# Patient Record
Sex: FEMALE | Race: BLACK OR AFRICAN AMERICAN | Employment: FULL TIME | URBAN - METROPOLITAN AREA
[De-identification: names, ages, dates, MRNs, and addresses within clinical notes are randomized per-mention and may not be internally consistent; named-entity substitution may affect disease eponyms.]

---

## 2017-03-22 PROBLEM — Z03.75 SUSPECTED SHORTENING OF CERVIX NOT FOUND: Status: ACTIVE | Noted: 2017-03-22

## 2017-03-22 PROBLEM — O09.529 AMA (ADVANCED MATERNAL AGE) MULTIGRAVIDA 35+: Status: ACTIVE | Noted: 2017-03-22

## 2017-03-22 PROBLEM — O09.299 H/O PRE-ECLAMPSIA IN PRIOR PREGNANCY, CURRENTLY PREGNANT: Status: ACTIVE | Noted: 2017-03-22

## 2017-06-30 PROBLEM — O36.8390 FETAL HEART RATE DECELERATIONS AFFECTING MANAGEMENT OF MOTHER: Status: ACTIVE | Noted: 2017-06-30

## 2017-06-30 PROBLEM — O34.33 PREMATURE CERVICAL DILATION IN THIRD TRIMESTER: Status: ACTIVE | Noted: 2017-03-22

## 2018-02-14 PROBLEM — N30.01 ACUTE CYSTITIS WITH HEMATURIA: Status: ACTIVE | Noted: 2018-02-14

## 2018-02-14 PROBLEM — J11.1 INFLUENZA WITH RESPIRATORY MANIFESTATION OTHER THAN PNEUMONIA: Status: ACTIVE | Noted: 2018-02-14

## 2018-02-14 PROBLEM — B00.9 HERPES SIMPLEX: Status: ACTIVE | Noted: 2018-02-14

## 2019-01-28 ENCOUNTER — HOSPITAL ENCOUNTER (EMERGENCY)
Age: 39
Discharge: HOME | End: 2019-01-28
Payer: COMMERCIAL

## 2019-01-28 VITALS
DIASTOLIC BLOOD PRESSURE: 90 MMHG | BODY MASS INDEX: 28.14 KG/M2 | RESPIRATION RATE: 16 BRPM | SYSTOLIC BLOOD PRESSURE: 140 MMHG | OXYGEN SATURATION: 97 % | HEIGHT: 71 IN | HEART RATE: 90 BPM | WEIGHT: 201 LBS | TEMPERATURE: 100.4 F

## 2019-01-28 DIAGNOSIS — L02.31 ABSCESS OF BUTTOCK, LEFT: Primary | ICD-10-CM

## 2019-01-28 PROCEDURE — 10060 I&D ABSCESS SIMPLE/SINGLE: CPT

## 2019-01-28 PROCEDURE — 99282 EMERGENCY DEPT VISIT SF MDM: CPT

## 2019-01-28 PROCEDURE — 6370000000 HC RX 637 (ALT 250 FOR IP): Performed by: NURSE PRACTITIONER

## 2019-01-28 PROCEDURE — 6370000000 HC RX 637 (ALT 250 FOR IP): Performed by: EMERGENCY MEDICINE

## 2019-01-28 PROCEDURE — 2500000003 HC RX 250 WO HCPCS

## 2019-01-28 RX ORDER — CEPHALEXIN 500 MG/1
500 CAPSULE ORAL 4 TIMES DAILY
Qty: 40 CAPSULE | Refills: 0 | Status: SHIPPED | OUTPATIENT
Start: 2019-01-28 | End: 2019-02-07

## 2019-01-28 RX ORDER — LIDOCAINE HYDROCHLORIDE 10 MG/ML
INJECTION, SOLUTION INFILTRATION; PERINEURAL
Status: COMPLETED
Start: 2019-01-28 | End: 2019-01-28

## 2019-01-28 RX ORDER — IBUPROFEN 800 MG/1
800 TABLET ORAL ONCE
Status: COMPLETED | OUTPATIENT
Start: 2019-01-28 | End: 2019-01-28

## 2019-01-28 RX ORDER — IBUPROFEN 800 MG/1
800 TABLET ORAL EVERY 6 HOURS PRN
Qty: 20 TABLET | Refills: 0 | Status: SHIPPED | OUTPATIENT
Start: 2019-01-28 | End: 2019-10-13

## 2019-01-28 RX ORDER — LIDOCAINE HYDROCHLORIDE 10 MG/ML
20 INJECTION, SOLUTION INFILTRATION; PERINEURAL ONCE
Status: COMPLETED | OUTPATIENT
Start: 2019-01-28 | End: 2019-01-28

## 2019-01-28 RX ORDER — SULFAMETHOXAZOLE AND TRIMETHOPRIM 800; 160 MG/1; MG/1
1 TABLET ORAL 2 TIMES DAILY
Qty: 20 TABLET | Refills: 0 | Status: SHIPPED | OUTPATIENT
Start: 2019-01-28 | End: 2019-02-07

## 2019-01-28 RX ORDER — CEPHALEXIN 500 MG/1
500 CAPSULE ORAL ONCE
Status: COMPLETED | OUTPATIENT
Start: 2019-01-28 | End: 2019-01-28

## 2019-01-28 RX ORDER — ACETAMINOPHEN 500 MG
1000 TABLET ORAL ONCE
Status: COMPLETED | OUTPATIENT
Start: 2019-01-28 | End: 2019-01-28

## 2019-01-28 RX ORDER — SULFAMETHOXAZOLE AND TRIMETHOPRIM 800; 160 MG/1; MG/1
1 TABLET ORAL ONCE
Status: COMPLETED | OUTPATIENT
Start: 2019-01-28 | End: 2019-01-28

## 2019-01-28 RX ADMIN — LIDOCAINE HYDROCHLORIDE 20 ML: 10 INJECTION, SOLUTION INFILTRATION; PERINEURAL at 23:09

## 2019-01-28 RX ADMIN — ACETAMINOPHEN 1000 MG: 500 TABLET ORAL at 22:03

## 2019-01-28 RX ADMIN — SULFAMETHOXAZOLE AND TRIMETHOPRIM 1 TABLET: 800; 160 TABLET ORAL at 23:08

## 2019-01-28 RX ADMIN — IBUPROFEN 800 MG: 800 TABLET ORAL at 22:48

## 2019-01-28 RX ADMIN — CEPHALEXIN 500 MG: 500 CAPSULE ORAL at 23:08

## 2019-01-28 ASSESSMENT — PAIN SCALES - GENERAL
PAINLEVEL_OUTOF10: 10

## 2019-01-28 ASSESSMENT — PAIN DESCRIPTION - LOCATION: LOCATION: BUTTOCKS

## 2019-01-28 ASSESSMENT — PAIN DESCRIPTION - ORIENTATION: ORIENTATION: LEFT

## 2019-01-28 ASSESSMENT — PAIN DESCRIPTION - PAIN TYPE: TYPE: ACUTE PAIN

## 2019-01-28 ASSESSMENT — PAIN DESCRIPTION - DESCRIPTORS: DESCRIPTORS: THROBBING;TENDER

## 2019-10-13 ENCOUNTER — HOSPITAL ENCOUNTER (EMERGENCY)
Age: 39
Discharge: HOME | End: 2019-10-13
Attending: FAMILY MEDICINE
Payer: COMMERCIAL

## 2019-10-13 VITALS
HEIGHT: 71 IN | SYSTOLIC BLOOD PRESSURE: 132 MMHG | BODY MASS INDEX: 28.14 KG/M2 | DIASTOLIC BLOOD PRESSURE: 86 MMHG | RESPIRATION RATE: 16 BRPM | OXYGEN SATURATION: 100 % | TEMPERATURE: 97.8 F | WEIGHT: 201 LBS | HEART RATE: 80 BPM

## 2019-10-13 DIAGNOSIS — Z3A.01 7 WEEKS GESTATION OF PREGNANCY: Primary | ICD-10-CM

## 2019-10-13 LAB
BILIRUBIN URINE: NEGATIVE
BLOOD, URINE: NEGATIVE
CLARITY: CLEAR
COLOR: YELLOW
GLUCOSE URINE: NEGATIVE MG/DL
HCG(URINE) PREGNANCY TEST: POSITIVE
KETONES, URINE: NEGATIVE MG/DL
LEUKOCYTE ESTERASE, URINE: NEGATIVE
NITRITE, URINE: NEGATIVE
PH UA: 6 (ref 5–9)
PROTEIN UA: NEGATIVE MG/DL
SPECIFIC GRAVITY UA: 1.01 (ref 1–1.03)
UROBILINOGEN, URINE: 0.2 E.U./DL

## 2019-10-13 PROCEDURE — 81003 URINALYSIS AUTO W/O SCOPE: CPT

## 2019-10-13 PROCEDURE — 99283 EMERGENCY DEPT VISIT LOW MDM: CPT

## 2019-10-13 PROCEDURE — 81025 URINE PREGNANCY TEST: CPT

## 2019-10-13 RX ORDER — PNV NO.95/FERROUS FUM/FOLIC AC 28MG-0.8MG
1 TABLET ORAL DAILY
Qty: 30 TABLET | Refills: 0 | Status: SHIPPED | OUTPATIENT
Start: 2019-10-13 | End: 2020-09-10 | Stop reason: ALTCHOICE

## 2019-10-13 ASSESSMENT — PAIN DESCRIPTION - LOCATION: LOCATION: VAGINA;ABDOMEN

## 2019-10-13 ASSESSMENT — PAIN DESCRIPTION - FREQUENCY: FREQUENCY: INTERMITTENT

## 2019-10-13 ASSESSMENT — PAIN DESCRIPTION - ORIENTATION: ORIENTATION: LOWER

## 2019-10-13 ASSESSMENT — PAIN DESCRIPTION - ONSET: ONSET: SUDDEN

## 2019-10-13 ASSESSMENT — PAIN SCALES - GENERAL: PAINLEVEL_OUTOF10: 8

## 2019-10-13 ASSESSMENT — PAIN DESCRIPTION - PAIN TYPE: TYPE: ACUTE PAIN

## 2019-10-13 ASSESSMENT — PAIN DESCRIPTION - PROGRESSION: CLINICAL_PROGRESSION: NOT CHANGED

## 2020-01-26 ENCOUNTER — HOSPITAL ENCOUNTER (EMERGENCY)
Age: 40
Discharge: HOME | End: 2020-01-26
Payer: COMMERCIAL

## 2020-01-26 VITALS
OXYGEN SATURATION: 100 % | RESPIRATION RATE: 18 BRPM | SYSTOLIC BLOOD PRESSURE: 146 MMHG | TEMPERATURE: 98 F | HEART RATE: 80 BPM | DIASTOLIC BLOOD PRESSURE: 77 MMHG

## 2020-01-26 PROCEDURE — 6370000000 HC RX 637 (ALT 250 FOR IP): Performed by: NURSE PRACTITIONER

## 2020-01-26 PROCEDURE — 99282 EMERGENCY DEPT VISIT SF MDM: CPT

## 2020-01-26 RX ORDER — OXYCODONE HYDROCHLORIDE AND ACETAMINOPHEN 5; 325 MG/1; MG/1
1 TABLET ORAL ONCE
Status: COMPLETED | OUTPATIENT
Start: 2020-01-26 | End: 2020-01-26

## 2020-01-26 RX ORDER — LIDOCAINE HYDROCHLORIDE 20 MG/ML
15 SOLUTION OROPHARYNGEAL
Qty: 100 ML | Refills: 0 | Status: SHIPPED | OUTPATIENT
Start: 2020-01-26 | End: 2020-09-10 | Stop reason: ALTCHOICE

## 2020-01-26 RX ADMIN — OXYCODONE HYDROCHLORIDE AND ACETAMINOPHEN 1 TABLET: 5; 325 TABLET ORAL at 19:57

## 2020-01-26 ASSESSMENT — PAIN SCALES - GENERAL
PAINLEVEL_OUTOF10: 10
PAINLEVEL_OUTOF10: 10
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
## 2020-07-14 ENCOUNTER — HOSPITAL ENCOUNTER (EMERGENCY)
Age: 40
Discharge: HOME | End: 2020-07-14
Payer: COMMERCIAL

## 2020-07-14 ENCOUNTER — APPOINTMENT (OUTPATIENT)
Dept: GENERAL RADIOLOGY | Age: 40
End: 2020-07-14
Payer: COMMERCIAL

## 2020-07-14 VITALS
RESPIRATION RATE: 16 BRPM | SYSTOLIC BLOOD PRESSURE: 126 MMHG | HEART RATE: 80 BPM | WEIGHT: 203 LBS | DIASTOLIC BLOOD PRESSURE: 82 MMHG | HEIGHT: 71 IN | BODY MASS INDEX: 28.42 KG/M2 | TEMPERATURE: 97.3 F

## 2020-07-14 PROCEDURE — 99284 EMERGENCY DEPT VISIT MOD MDM: CPT

## 2020-07-14 PROCEDURE — 70450 CT HEAD/BRAIN W/O DYE: CPT

## 2020-07-14 PROCEDURE — 70486 CT MAXILLOFACIAL W/O DYE: CPT

## 2020-07-14 PROCEDURE — 71046 X-RAY EXAM CHEST 2 VIEWS: CPT

## 2020-07-14 ASSESSMENT — PAIN DESCRIPTION - LOCATION: LOCATION: JAW

## 2020-07-14 ASSESSMENT — PAIN SCALES - GENERAL: PAINLEVEL_OUTOF10: 4

## 2020-07-14 ASSESSMENT — PAIN DESCRIPTION - ORIENTATION: ORIENTATION: LEFT
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
## 2020-07-28 ENCOUNTER — APPOINTMENT (OUTPATIENT)
Dept: GENERAL RADIOLOGY | Age: 40
End: 2020-07-28
Payer: COMMERCIAL

## 2020-07-28 ENCOUNTER — HOSPITAL ENCOUNTER (EMERGENCY)
Age: 40
Discharge: HOME | End: 2020-07-28
Attending: EMERGENCY MEDICINE
Payer: COMMERCIAL

## 2020-07-28 VITALS
OXYGEN SATURATION: 99 % | BODY MASS INDEX: 30.1 KG/M2 | RESPIRATION RATE: 16 BRPM | SYSTOLIC BLOOD PRESSURE: 122 MMHG | DIASTOLIC BLOOD PRESSURE: 68 MMHG | WEIGHT: 215 LBS | TEMPERATURE: 97.8 F | HEIGHT: 71 IN | HEART RATE: 80 BPM

## 2020-07-28 PROCEDURE — 6370000000 HC RX 637 (ALT 250 FOR IP): Performed by: EMERGENCY MEDICINE

## 2020-07-28 PROCEDURE — 73610 X-RAY EXAM OF ANKLE: CPT

## 2020-07-28 PROCEDURE — 99283 EMERGENCY DEPT VISIT LOW MDM: CPT

## 2020-07-28 PROCEDURE — 73630 X-RAY EXAM OF FOOT: CPT

## 2020-07-28 RX ORDER — HYDROCODONE BITARTRATE AND ACETAMINOPHEN 5; 325 MG/1; MG/1
1 TABLET ORAL EVERY 6 HOURS PRN
Qty: 6 TABLET | Refills: 0 | Status: SHIPPED | OUTPATIENT
Start: 2020-07-28 | End: 2020-07-31

## 2020-07-28 RX ORDER — ACETAMINOPHEN 500 MG
1000 TABLET ORAL ONCE
Status: COMPLETED | OUTPATIENT
Start: 2020-07-28 | End: 2020-07-28

## 2020-07-28 RX ORDER — NAPROXEN 500 MG/1
500 TABLET ORAL 2 TIMES DAILY
Qty: 14 TABLET | Refills: 0 | Status: SHIPPED | OUTPATIENT
Start: 2020-07-28 | End: 2020-09-10 | Stop reason: ALTCHOICE

## 2020-07-28 RX ADMIN — ACETAMINOPHEN 1000 MG: 500 TABLET ORAL at 11:17

## 2020-07-28 ASSESSMENT — PAIN SCALES - GENERAL
PAINLEVEL_OUTOF10: 9
PAINLEVEL_OUTOF10: 7

## 2020-07-28 ASSESSMENT — PAIN DESCRIPTION - ORIENTATION: ORIENTATION: LEFT

## 2020-07-28 ASSESSMENT — PAIN DESCRIPTION - LOCATION: LOCATION: ANKLE

## 2020-07-28 ASSESSMENT — PAIN DESCRIPTION - PAIN TYPE: TYPE: ACUTE PAIN

## 2020-07-28 NOTE — ED NOTES
Left foot has swelling on lateral side with pain below it and last three toes going numb, unable to bare weight. Pedal pulse +3 , toes cap refill < 3 sec.  Skin is warm      Paige Buenrostro RN  07/28/20 0399
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
## 2021-02-19 PROBLEM — O99.211 OBESITY AFFECTING PREGNANCY IN FIRST TRIMESTER: Status: ACTIVE | Noted: 2021-02-19

## 2021-02-19 PROBLEM — Z34.81 ENCOUNTER FOR SUPERVISION OF OTHER NORMAL PREGNANCY, FIRST TRIMESTER: Status: ACTIVE | Noted: 2021-02-19

## 2021-04-08 ENCOUNTER — HOSPITAL ENCOUNTER (OUTPATIENT)
Age: 41
Setting detail: OBSERVATION
Discharge: HOME | End: 2021-04-08
Attending: OBSTETRICS & GYNECOLOGY | Admitting: OBSTETRICS & GYNECOLOGY
Payer: COMMERCIAL

## 2021-04-08 VITALS
HEART RATE: 72 BPM | DIASTOLIC BLOOD PRESSURE: 59 MMHG | TEMPERATURE: 98.2 F | SYSTOLIC BLOOD PRESSURE: 128 MMHG | RESPIRATION RATE: 16 BRPM

## 2021-04-08 PROBLEM — N30.01 ACUTE CYSTITIS WITH HEMATURIA: Status: RESOLVED | Noted: 2018-02-14 | Resolved: 2021-04-08

## 2021-04-08 PROBLEM — Z3A.18 18 WEEKS GESTATION OF PREGNANCY: Status: ACTIVE | Noted: 2021-04-08

## 2021-04-08 PROBLEM — J11.1 INFLUENZA WITH RESPIRATORY MANIFESTATION OTHER THAN PNEUMONIA: Status: RESOLVED | Noted: 2018-02-14 | Resolved: 2021-04-08

## 2021-04-08 PROBLEM — O34.33 PREMATURE CERVICAL DILATION IN THIRD TRIMESTER: Status: RESOLVED | Noted: 2017-03-22 | Resolved: 2021-04-08

## 2021-04-08 PROBLEM — O26.852 SPOTTING AFFECTING PREGNANCY IN SECOND TRIMESTER: Status: ACTIVE | Noted: 2021-04-08

## 2021-04-08 PROBLEM — O99.211 OBESITY AFFECTING PREGNANCY IN FIRST TRIMESTER: Status: RESOLVED | Noted: 2021-02-19 | Resolved: 2021-04-08

## 2021-04-08 PROBLEM — O36.8390 FETAL HEART RATE DECELERATIONS AFFECTING MANAGEMENT OF MOTHER: Status: RESOLVED | Noted: 2017-06-30 | Resolved: 2021-04-08

## 2021-04-08 PROBLEM — Z34.81 ENCOUNTER FOR SUPERVISION OF OTHER NORMAL PREGNANCY, FIRST TRIMESTER: Status: RESOLVED | Noted: 2021-02-19 | Resolved: 2021-04-08

## 2021-04-08 LAB
BACTERIA: ABNORMAL /HPF
BILIRUBIN URINE: NEGATIVE
BLOOD, URINE: NEGATIVE
CLARITY: CLEAR
COLOR: YELLOW
EPITHELIAL CELLS, UA: ABNORMAL /HPF
GLUCOSE URINE: NEGATIVE MG/DL
KETONES, URINE: NEGATIVE MG/DL
LEUKOCYTE ESTERASE, URINE: NEGATIVE
NITRITE, URINE: NEGATIVE
PH UA: 6.5 (ref 5–9)
PROTEIN UA: ABNORMAL MG/DL
RBC UA: ABNORMAL /HPF (ref 0–2)
SPECIFIC GRAVITY UA: 1.02 (ref 1–1.03)
UROBILINOGEN, URINE: 4 E.U./DL
WBC UA: ABNORMAL /HPF (ref 0–5)

## 2021-04-08 PROCEDURE — 99211 OFF/OP EST MAY X REQ PHY/QHP: CPT

## 2021-04-08 PROCEDURE — 81001 URINALYSIS AUTO W/SCOPE: CPT

## 2021-04-08 NOTE — PROGRESS NOTES
Pt presents to unit with complaints of vaginal bleeding and constant pressure, stating \"she feels like she has to poop\" starting around 2230. Pt states that she spent her evening rearranging her bedroom and flipping her mattress and noticed the bleeding when she was in the shower on a white washcloth. The bleeding was noted again when she went to the restroom on the toilet tissue. Pt states that it is bright red in color. IUP @ 18w6d. [id number]-[id number] vaginal deliveries. EMERSON: 2021. Pt denies any LOF. States that she does not feel fetal movement at this gestational age and that she has not noticed anything that feels like a contraction. FHT's obtained in the 140's. Continuous TOCO applied. VSS. Call light within reach.

## 2021-04-08 NOTE — PROGRESS NOTES
Pt given written and verbal discharge instructions including to follow up with Dr. uLcila Krishnan in the office. Pt verbalized understanding and stated no questions at this time. Ambulated off of unit independently.
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
## 2021-04-26 PROBLEM — Z3A.18 18 WEEKS GESTATION OF PREGNANCY: Status: RESOLVED | Noted: 2021-04-08 | Resolved: 2021-04-26

## 2021-04-29 ENCOUNTER — HOSPITAL ENCOUNTER (EMERGENCY)
Age: 41
Discharge: HOME | End: 2021-04-30
Attending: EMERGENCY MEDICINE
Payer: COMMERCIAL

## 2021-04-29 VITALS
SYSTOLIC BLOOD PRESSURE: 122 MMHG | OXYGEN SATURATION: 92 % | RESPIRATION RATE: 16 BRPM | HEIGHT: 71 IN | HEART RATE: 90 BPM | BODY MASS INDEX: 41.02 KG/M2 | TEMPERATURE: 97.1 F | DIASTOLIC BLOOD PRESSURE: 82 MMHG | WEIGHT: 293 LBS

## 2021-04-29 DIAGNOSIS — R09.81 SINUS CONGESTION: ICD-10-CM

## 2021-04-29 DIAGNOSIS — O21.0 MILD HYPEREMESIS GRAVIDARUM, ANTEPARTUM: Primary | ICD-10-CM

## 2021-04-29 LAB
ALBUMIN SERPL-MCNC: 3.4 G/DL (ref 3.5–5.2)
ALP BLD-CCNC: 75 U/L (ref 35–104)
ALT SERPL-CCNC: 7 U/L (ref 0–32)
ANION GAP SERPL CALCULATED.3IONS-SCNC: 10 MMOL/L (ref 7–16)
AST SERPL-CCNC: 13 U/L (ref 0–31)
BILIRUB SERPL-MCNC: 0.2 MG/DL (ref 0–1.2)
BILIRUBIN URINE: NEGATIVE
BLOOD, URINE: NEGATIVE
BUN BLDV-MCNC: 7 MG/DL (ref 6–20)
CALCIUM SERPL-MCNC: 8.6 MG/DL (ref 8.6–10.2)
CHLORIDE BLD-SCNC: 105 MMOL/L (ref 98–107)
CLARITY: CLEAR
CO2: 23 MMOL/L (ref 22–29)
COLOR: YELLOW
CREAT SERPL-MCNC: 0.6 MG/DL (ref 0.5–1)
GFR AFRICAN AMERICAN: >60
GFR NON-AFRICAN AMERICAN: >60 ML/MIN/1.73
GLUCOSE BLD-MCNC: 92 MG/DL (ref 74–99)
GLUCOSE URINE: NEGATIVE MG/DL
KETONES, URINE: NEGATIVE MG/DL
LACTIC ACID: 0.8 MMOL/L (ref 0.5–2.2)
LEUKOCYTE ESTERASE, URINE: NEGATIVE
NITRITE, URINE: NEGATIVE
PH UA: 7 (ref 5–9)
POTASSIUM SERPL-SCNC: 3.9 MMOL/L (ref 3.5–5)
PROTEIN UA: NEGATIVE MG/DL
SODIUM BLD-SCNC: 138 MMOL/L (ref 132–146)
SPECIFIC GRAVITY UA: 1.02 (ref 1–1.03)
STREP GRP A PCR: NEGATIVE
TOTAL PROTEIN: 6.8 G/DL (ref 6.4–8.3)
UROBILINOGEN, URINE: 2 E.U./DL

## 2021-04-29 PROCEDURE — 87880 STREP A ASSAY W/OPTIC: CPT

## 2021-04-29 PROCEDURE — 80053 COMPREHEN METABOLIC PANEL: CPT

## 2021-04-29 PROCEDURE — 81003 URINALYSIS AUTO W/O SCOPE: CPT

## 2021-04-29 PROCEDURE — 83605 ASSAY OF LACTIC ACID: CPT

## 2021-04-29 PROCEDURE — 6360000002 HC RX W HCPCS: Performed by: EMERGENCY MEDICINE

## 2021-04-29 PROCEDURE — 96374 THER/PROPH/DIAG INJ IV PUSH: CPT

## 2021-04-29 PROCEDURE — 99283 EMERGENCY DEPT VISIT LOW MDM: CPT

## 2021-04-29 PROCEDURE — 36415 COLL VENOUS BLD VENIPUNCTURE: CPT

## 2021-04-29 PROCEDURE — 2580000003 HC RX 258: Performed by: EMERGENCY MEDICINE

## 2021-04-29 RX ORDER — ONDANSETRON 2 MG/ML
4 INJECTION INTRAMUSCULAR; INTRAVENOUS ONCE
Status: COMPLETED | OUTPATIENT
Start: 2021-04-29 | End: 2021-04-29

## 2021-04-29 RX ORDER — 0.9 % SODIUM CHLORIDE 0.9 %
1000 INTRAVENOUS SOLUTION INTRAVENOUS ONCE
Status: COMPLETED | OUTPATIENT
Start: 2021-04-29 | End: 2021-04-30

## 2021-04-29 RX ADMIN — ONDANSETRON 4 MG: 2 INJECTION INTRAMUSCULAR; INTRAVENOUS at 23:11

## 2021-04-29 RX ADMIN — SODIUM CHLORIDE 1000 ML: 9 INJECTION, SOLUTION INTRAVENOUS at 23:11

## 2021-04-30 RX ORDER — DOXYLAMINE SUCCINATE AND PYRIDOXINE HYDROCHLORIDE, DELAYED RELEASE TABLETS 10 MG/10 MG 10; 10 MG/1; MG/1
10 TABLET, DELAYED RELEASE ORAL 2 TIMES DAILY PRN
Qty: 60 TABLET | Refills: 0 | Status: ON HOLD | OUTPATIENT
Start: 2021-04-30 | End: 2021-08-26 | Stop reason: HOSPADM

## 2021-04-30 NOTE — ED PROVIDER NOTES
HPI:  4/29/21, Time: 10:49 PM EDT        Jie Bernard is a 45 y.o. female presenting to the ED for nausea vomiting sinus congestion, beginning 3 days ago. The complaint has been persistent, moderate in severity, and worsened by nothing. Patient is also has a sore throat. Patient is 21 weeks pregnant; no associated fever/chills, no chest pain, no abdominal pain, no vaginal spotting or bleeding reported. No other complaints. Review of Systems:   A complete review of systems was performed and pertinent positives and negatives are stated within HPI, all other systems reviewed and are negative.    --------------------------------------------- PAST HISTORY ---------------------------------------------  Past Medical History:  has a past medical history of Obesity complicating pregnancy, childbirth, or puerperium, antepartum, PIH (pregnancy induced hypertension), and Traumatic injury during pregnancy. Past Surgical History:  has a past surgical history that includes Dilation & curettage (2010) and [place] tooth extraction (2014). Social History:  reports that she has never smoked. She has never used smokeless tobacco. She reports that she does not drink alcohol or use drugs. Family History: family history is not on file. The patients home medications have been reviewed. Allergies: Patient has no known allergies.     -------------------------------------------------- RESULTS -------------------------------------------------  All laboratory and radiology results have been personally reviewed by myself   LABS:  Results for orders placed or performed during the hospital encounter of 04/29/21   Strep Screen Group A Throat    Specimen: Throat   Result Value Ref Range    Strep Grp A PCR Negative Negative   Urinalysis   Result Value Ref Range    Color, UA Yellow Straw/Yellow    Clarity, UA Clear Clear    Glucose, Ur Negative Negative mg/dL    Bilirubin Urine Negative Negative    Ketones, Urine Negative Negative mg/dL    Specific Gravity, UA 1.020 1.005 - 1.030    Blood, Urine Negative Negative    pH, UA 7.0 5.0 - 9.0    Protein, UA Negative Negative mg/dL    Urobilinogen, Urine 2.0 (A) <2.0 E.U./dL    Nitrite, Urine Negative Negative    Leukocyte Esterase, Urine Negative Negative   Comprehensive Metabolic Panel   Result Value Ref Range    Sodium 138 132 - 146 mmol/L    Potassium 3.9 3.5 - 5.0 mmol/L    Chloride 105 98 - 107 mmol/L    CO2 23 22 - 29 mmol/L    Anion Gap 10 7 - 16 mmol/L    Glucose 92 74 - 99 mg/dL    BUN 7 6 - 20 mg/dL    CREATININE 0.6 0.5 - 1.0 mg/dL    GFR Non-African American >60 >=60 mL/min/1.73    GFR African American >60     Calcium 8.6 8.6 - 10.2 mg/dL    Total Protein 6.8 6.4 - 8.3 g/dL    Albumin 3.4 (L) 3.5 - 5.2 g/dL    Total Bilirubin 0.2 0.0 - 1.2 mg/dL    Alkaline Phosphatase 75 35 - 104 U/L    ALT 7 0 - 32 U/L    AST 13 0 - 31 U/L   Lactic Acid, Plasma   Result Value Ref Range    Lactic Acid 0.8 0.5 - 2.2 mmol/L       RADIOLOGY:  Interpreted by Radiologist.  No orders to display       ------------------------- NURSING NOTES AND VITALS REVIEWED ---------------------------  The nursing notes within the ED encounter and vital signs as below have been reviewed. /82   Pulse 90   Temp 97.1 °F (36.2 °C) (Temporal)   Resp 16   Ht 5' 11\" (1.803 m)   Wt (!) 305 lb (138.3 kg)   LMP 11/27/2020   SpO2 92%   BMI 42.54 kg/m²   Oxygen Saturation Interpretation: Normal      ---------------------------------------------------PHYSICAL EXAM--------------------------------------      Constitutional/General: Alert and oriented x3, well appearing, non toxic in NAD  Head: Normocephalic and atraumatic  Eyes: PERRL, EOMI  Mouth: Oropharynx clear, handling secretions, no trismus  Neck: Supple, full ROM,   Pulmonary: Lungs clear to auscultation bilaterally, no wheezes, rales, or rhonchi. Not in respiratory distress  Cardiovascular:  Regular rate and rhythm, no murmurs, gallops, or rubs.  2+ distal pulses  Abdomen: Soft, non tender, non distended, no organomegaly no masses no guarding no rigidity normal bowel sounds  Extremities: Moves all extremities x 4. Warm and well perfused  Skin: warm and dry without rash  Neurologic: GCS 15, cranial nerves grossly intact no focal deficits. No meningeal signs  Psych: Normal Affect      ------------------------------ ED COURSE/MEDICAL DECISION MAKING----------------------  Medications   0.9 % sodium chloride bolus (0 mLs Intravenous Stopped 4/30/21 0020)   ondansetron (ZOFRAN) injection 4 mg (4 mg Intravenous Given 4/29/21 9581)       ED COURSE:    Medical Decision Making:   Check fetal heart tones of the patient's fetal heart rate 145 she denies any spotting or bleeding. She has not had any abdominal pain and has no fundal rigidity nor any findings to suggest placental abruption. Patient has sinus congestion which is likely viral.  I advised patient to follow-up with her OB tomorrow. And she has seen an ENT specialist for sinus problems in the past and I will refer her to ENT on call as well. Counseling: The emergency provider has spoken with the patient and discussed todays results, in addition to providing specific details for the plan of care and counseling regarding the diagnosis and prognosis. Questions are answered at this time and they are agreeable with the plan.    --------------------------------- IMPRESSION AND DISPOSITION ---------------------------------    IMPRESSION  1. Mild hyperemesis gravidarum, antepartum    2. Sinus congestion        DISPOSITION  Disposition: Discharge to home  Patient condition is stable      NOTE: This report was transcribed using voice recognition software.  Every effort was made to ensure accuracy; however, inadvertent computerized transcription errors may be present        Delon Fatima MD  04/30/21 5936

## 2021-07-05 ENCOUNTER — HOSPITAL ENCOUNTER (OUTPATIENT)
Age: 41
Discharge: HOME | End: 2021-07-05
Attending: OBSTETRICS & GYNECOLOGY | Admitting: OBSTETRICS & GYNECOLOGY
Payer: COMMERCIAL

## 2021-07-05 VITALS
HEART RATE: 86 BPM | RESPIRATION RATE: 16 BRPM | DIASTOLIC BLOOD PRESSURE: 49 MMHG | TEMPERATURE: 98.3 F | SYSTOLIC BLOOD PRESSURE: 105 MMHG

## 2021-07-05 PROBLEM — Z3A.31 31 WEEKS GESTATION OF PREGNANCY: Status: ACTIVE | Noted: 2021-07-05

## 2021-07-05 LAB
AMNISURE, POC: NEGATIVE
Lab: NORMAL
NEGATIVE QC PASS/FAIL: NORMAL
POSITIVE QC PASS/FAIL: NORMAL

## 2021-07-05 PROCEDURE — 87088 URINE BACTERIA CULTURE: CPT

## 2021-07-05 PROCEDURE — 99211 OFF/OP EST MAY X REQ PHY/QHP: CPT

## 2021-07-05 PROCEDURE — 99213 OFFICE O/P EST LOW 20 MIN: CPT | Performed by: MIDWIFE

## 2021-07-05 NOTE — PROGRESS NOTES
Pt presents to unit with complaints of having a hard time controlling her bladder. States she urinates every time she stands up. Amnisure test done with negative results to ensure not leaking amniotic fluid. Pt denies any VB or contractions. States +FM. Pt has hx of GDM    IUP @ 31w3d. R6W2 with previous vaginal deliveries. EFM applied with FHT's obtained. VSS. Call light within reach.

## 2021-07-05 NOTE — H&P
Department of Obstetrics and Gynecology   Obstetrics History and Physical      CHIEF COMPLAINT:  Leakage of Urine    HISTORY OF PRESENT ILLNESS:      The patient is a 45 y.o. [id number] at 31 weeks 3 days  Patient presents with a chief complaint as above and is being admitted for leakage of urine 3-4 times per day with walking or moving. Pt states she wakes up with a wet bed in the morning and is incontinent at hs. Pt c/o left lower abdominal pain with fetal movement.  + FM. Denies ctx/ vb. Pt hx GDM, HSV 2 ,  SVDx3. DATES:    Patient's last menstrual period was 2020.     Estimated Date of Delivery: 9/3/21    PRENATAL CARE:    Provider:  Jennifer Morel    Blood Type/Rh:  b Positive  Hepatitis B Surface Antigen: Negative  Gonorrhea:  negative  Chlamydia:  negative  Group B Strep:  unknown      PAST OB HISTORY        Depression:  No      Post-partum depression:  No      Diabetes:  No      Gestational Diabetes:  No      Thyroid Disease:  No      Chronic HTN:  No      Gestation HTN:  No      Pre-eclampsia:  Yes       Seizure disorder:  No      Asthma:  No      Clotting disorder:  No      :  No      Tubal ligation:  No      D & C:  Yes       Cerclage:  No      LEEP:  No      Myomectomy:  No    OB History    Para Term  AB Living   6 3 3 0 2 3   SAB TAB Ectopic Molar Multiple Live Births   2 0 0 0 0 3      # Outcome Date GA Lbr Shilo/2nd Weight Sex Delivery Anes PTL Lv   6 Current            5 2020           4 Term 17 37w0d  6 lb 5 oz (2.863 kg) M Vag-Spont   WIL   3 Term 03/21/15 37w1d  7 lb 14 oz (3.572 kg) M Vag-Spont EPI N WIL   2 SAB 12 8w0d          1 Term 06 40w0d 01:00 9 lb 8 oz (4.309 kg) M Vag-Spont EPI N WIL           Past Medical History:        Diagnosis Date    Obesity complicating pregnancy, childbirth, or puerperium, antepartum 2014    PIH (pregnancy induced hypertension) 3/21/2015    Traumatic injury during pregnancy      Past Surgical History: Procedure Laterality Date    DILATION AND CURETTAGE  2010    WISDOM TOOTH EXTRACTION  2014     Social History:    Denies smoking, drugs or alcohol use  Family History:   No family history on file. Medications Prior to Admission:  Medications Prior to Admission: blood glucose monitor kit and supplies, Use as directed. blood glucose monitor strips, Test glucose 4 times a day. Lancets MISC, Test glucose 4 times a day. doxylamine-pyridoxine 10-10 MG TBEC, Take 10 mg by mouth 2 times daily as needed (nausea and vomting) This medicine is safe in pregnancy. aspirin 81 MG EC tablet, Take 81 mg by mouth daily  Prenatal Vit-Fe Fumarate-FA (PRENATAL VITAMIN) CHEW, Take 1 tablet by mouth daily  metroNIDAZOLE (METROGEL VAGINAL) 0.75 % vaginal gel, Insert one applicator at night times 5 nights. Allergies:  Patient has no known allergies. PHYSICAL EXAM:    VITALS:  BP (!) 105/49   Pulse 86   Temp 98.3 °F (36.8 °C) (Oral)   Resp 16   LMP 11/27/2020       General appearance:  awake, alert, cooperative, no apparent distress, and appears stated age  Neurologic:  Awake, alert, oriented to name, place and time.     Lungs:  No increased work of breathing, good air exchange, clear to auscultation bilaterally, no crackles or wheezing  Heart:  Normal apical impulse, regular rate and rhythm  Abdomen:  Gravid , soft, nontender  Fetal heart rate:  Baseline Heart Rate 145, accelerations:  present  long term variability:  moderate  decelerations:  absent  Pelvis:  External Genitalia: General appearance; normal, Hair distribution; normal, Lesions absent, no fluid noted with  Vagina: Pelvic support normal  Cervix ; DILATION:  Closed with spec exam    Contraction frequency:  0 minutes  Membranes:  Intact, amnisure negative      ASSESSMENT AND PLAN:  Active Problems:    IUP @ 31 3/7 week gestation    Urinary incontinence, Stress incontinence    LRQ abdominal pain  Discussed with Dr. Collette Reyes  Urinalysis and culture  Pt may be discharged to home and to follow up with Dr. Kush Hein in the office    Evin Muro CNM

## 2021-07-05 NOTE — PROGRESS NOTES
Discharge instructions given to pt. Pt states no questions at this time. Ambulated off unit independently.

## 2021-07-07 LAB — URINE CULTURE, ROUTINE: NORMAL

## 2021-07-23 ENCOUNTER — HOSPITAL ENCOUNTER (OUTPATIENT)
Age: 41
Discharge: HOME | End: 2021-07-23
Attending: OBSTETRICS & GYNECOLOGY | Admitting: OBSTETRICS & GYNECOLOGY
Payer: COMMERCIAL

## 2021-07-23 VITALS
HEIGHT: 71 IN | HEART RATE: 77 BPM | TEMPERATURE: 98.8 F | WEIGHT: 293 LBS | DIASTOLIC BLOOD PRESSURE: 66 MMHG | RESPIRATION RATE: 14 BRPM | SYSTOLIC BLOOD PRESSURE: 128 MMHG | BODY MASS INDEX: 41.02 KG/M2

## 2021-07-23 DIAGNOSIS — Z34.93 PRENATAL CARE IN THIRD TRIMESTER: ICD-10-CM

## 2021-07-23 DIAGNOSIS — O24.415 GESTATIONAL DIABETES MELLITUS (GDM) IN THIRD TRIMESTER CONTROLLED ON ORAL HYPOGLYCEMIC DRUG: ICD-10-CM

## 2021-07-23 DIAGNOSIS — O14.93 PRE-ECLAMPSIA IN THIRD TRIMESTER: ICD-10-CM

## 2021-07-23 PROBLEM — I10 HYPERTENSION: Status: ACTIVE | Noted: 2021-07-23

## 2021-07-23 LAB
ALBUMIN SERPL-MCNC: 3.2 G/DL (ref 3.5–5.2)
ALBUMIN SERPL-MCNC: 3.2 G/DL (ref 3.5–5.2)
ALP BLD-CCNC: 113 U/L (ref 35–104)
ALP BLD-CCNC: 79 U/L (ref 35–104)
ALT SERPL-CCNC: 13 U/L (ref 0–32)
ALT SERPL-CCNC: 14 U/L (ref 0–32)
ANION GAP SERPL CALCULATED.3IONS-SCNC: 12 MMOL/L (ref 7–16)
ANION GAP SERPL CALCULATED.3IONS-SCNC: 9 MMOL/L (ref 7–16)
AST SERPL-CCNC: 18 U/L (ref 0–31)
AST SERPL-CCNC: 21 U/L (ref 0–31)
BACTERIA: ABNORMAL /HPF
BILIRUB SERPL-MCNC: 0.4 MG/DL (ref 0–1.2)
BILIRUB SERPL-MCNC: 0.4 MG/DL (ref 0–1.2)
BILIRUBIN URINE: ABNORMAL
BLOOD, URINE: NEGATIVE
BUN BLDV-MCNC: 7 MG/DL (ref 6–20)
BUN BLDV-MCNC: 9 MG/DL (ref 6–20)
CALCIUM SERPL-MCNC: 8.3 MG/DL (ref 8.6–10.2)
CALCIUM SERPL-MCNC: 8.6 MG/DL (ref 8.6–10.2)
CHLORIDE BLD-SCNC: 104 MMOL/L (ref 98–107)
CHLORIDE BLD-SCNC: 105 MMOL/L (ref 98–107)
CLARITY: CLEAR
CO2: 22 MMOL/L (ref 22–29)
CO2: 23 MMOL/L (ref 22–29)
COLOR: YELLOW
CREAT SERPL-MCNC: 0.6 MG/DL (ref 0.5–1)
CREAT SERPL-MCNC: 0.6 MG/DL (ref 0.5–1)
CREATININE URINE: 308 MG/DL (ref 29–226)
GFR AFRICAN AMERICAN: >60
GFR AFRICAN AMERICAN: >60
GFR NON-AFRICAN AMERICAN: >60 ML/MIN/1.73
GFR NON-AFRICAN AMERICAN: >60 ML/MIN/1.73
GLUCOSE BLD-MCNC: 76 MG/DL (ref 74–99)
GLUCOSE BLD-MCNC: 77 MG/DL (ref 74–99)
GLUCOSE URINE: NEGATIVE MG/DL
HBA1C MFR BLD: 4.5 % (ref 4–5.6)
HCT VFR BLD CALC: 31.6 % (ref 34–48)
HCT VFR BLD CALC: 34.9 % (ref 34–48)
HEMOGLOBIN: 10.4 G/DL (ref 11.5–15.5)
HEMOGLOBIN: 10.7 G/DL (ref 11.5–15.5)
KETONES, URINE: NEGATIVE MG/DL
LACTATE DEHYDROGENASE: 222 U/L (ref 135–214)
LEUKOCYTE ESTERASE, URINE: NEGATIVE
MCH RBC QN AUTO: 32.1 PG (ref 26–35)
MCH RBC QN AUTO: 32.4 PG (ref 26–35)
MCHC RBC AUTO-ENTMCNC: 30.7 % (ref 32–34.5)
MCHC RBC AUTO-ENTMCNC: 32.9 % (ref 32–34.5)
MCV RBC AUTO: 104.8 FL (ref 80–99.9)
MCV RBC AUTO: 98.4 FL (ref 80–99.9)
MUCUS: PRESENT /LPF
NITRITE, URINE: NEGATIVE
PDW BLD-RTO: 13.5 FL (ref 11.5–15)
PDW BLD-RTO: 14 FL (ref 11.5–15)
PH UA: 6 (ref 5–9)
PLATELET # BLD: 195 E9/L (ref 130–450)
PLATELET # BLD: 287 E9/L (ref 130–450)
PMV BLD AUTO: 10.8 FL (ref 7–12)
PMV BLD AUTO: 9.7 FL (ref 7–12)
POTASSIUM SERPL-SCNC: 3.7 MMOL/L (ref 3.5–5)
POTASSIUM SERPL-SCNC: 3.7 MMOL/L (ref 3.5–5)
PROTEIN PROTEIN: 29 MG/DL (ref 0–12)
PROTEIN UA: ABNORMAL MG/DL
PROTEIN/CREAT RATIO: 0.1
PROTEIN/CREAT RATIO: 0.1 (ref 0–0.2)
RBC # BLD: 3.21 E12/L (ref 3.5–5.5)
RBC # BLD: 3.33 E12/L (ref 3.5–5.5)
RBC UA: ABNORMAL /HPF (ref 0–2)
SODIUM BLD-SCNC: 136 MMOL/L (ref 132–146)
SODIUM BLD-SCNC: 139 MMOL/L (ref 132–146)
SPECIFIC GRAVITY UA: >=1.03 (ref 1–1.03)
TOTAL PROTEIN: 6.6 G/DL (ref 6.4–8.3)
TOTAL PROTEIN: 6.7 G/DL (ref 6.4–8.3)
UROBILINOGEN, URINE: >=8 E.U./DL
WBC # BLD: 8.3 E9/L (ref 4.5–11.5)
WBC # BLD: 9.4 E9/L (ref 4.5–11.5)
WBC UA: ABNORMAL /HPF (ref 0–5)

## 2021-07-23 PROCEDURE — 36415 COLL VENOUS BLD VENIPUNCTURE: CPT

## 2021-07-23 PROCEDURE — 80053 COMPREHEN METABOLIC PANEL: CPT

## 2021-07-23 PROCEDURE — 84156 ASSAY OF PROTEIN URINE: CPT

## 2021-07-23 PROCEDURE — 99202 OFFICE O/P NEW SF 15 MIN: CPT

## 2021-07-23 PROCEDURE — 85027 COMPLETE CBC AUTOMATED: CPT

## 2021-07-23 PROCEDURE — 82570 ASSAY OF URINE CREATININE: CPT

## 2021-07-23 PROCEDURE — 81001 URINALYSIS AUTO W/SCOPE: CPT

## 2021-07-24 NOTE — PROGRESS NOTES
Patient given verbal and written discharge instructions with standard labor precautions instructed to keep follow up appointment with physician. Patient verbalizes understanding states no questions or concerns. Patient ambulatory off.

## 2021-07-24 NOTE — PROGRESS NOTES
Updated Dr. Phi Jacobo on labs, BP, and tracing.  Patient ok to be discharged home after 10 more minutes of FHT

## 2021-07-26 PROBLEM — Z3A.31 31 WEEKS GESTATION OF PREGNANCY: Status: RESOLVED | Noted: 2021-07-05 | Resolved: 2021-07-26

## 2021-08-02 ENCOUNTER — HOSPITAL ENCOUNTER (OUTPATIENT)
Age: 41
Discharge: HOME | End: 2021-08-02
Attending: OBSTETRICS & GYNECOLOGY | Admitting: OBSTETRICS & GYNECOLOGY
Payer: COMMERCIAL

## 2021-08-02 VITALS
BODY MASS INDEX: 41.02 KG/M2 | RESPIRATION RATE: 16 BRPM | SYSTOLIC BLOOD PRESSURE: 115 MMHG | OXYGEN SATURATION: 99 % | HEIGHT: 71 IN | HEART RATE: 82 BPM | DIASTOLIC BLOOD PRESSURE: 58 MMHG | WEIGHT: 293 LBS | TEMPERATURE: 98.6 F

## 2021-08-02 PROBLEM — O36.8190 DECREASED FETAL MOVEMENT AFFECTING MANAGEMENT OF MOTHER, ANTEPARTUM: Status: ACTIVE | Noted: 2021-08-02

## 2021-08-02 PROBLEM — O36.8130 DECREASED FETUS MOVEMENTS AFFECTING MANAGEMENT OF MOTHER IN THIRD TRIMESTER: Status: ACTIVE | Noted: 2021-08-02

## 2021-08-02 PROCEDURE — 99215 OFFICE O/P EST HI 40 MIN: CPT | Performed by: OBSTETRICS & GYNECOLOGY

## 2021-08-02 PROCEDURE — 99211 OFF/OP EST MAY X REQ PHY/QHP: CPT

## 2021-08-02 ASSESSMENT — PAIN DESCRIPTION - DESCRIPTORS: DESCRIPTORS: CRAMPING

## 2021-08-02 NOTE — H&P
Department of Obstetrics and Gynecology  Attending Obstetrics History and Physical        CHIEF COMPLAINT:  sent fom antepartum testing     HISTORY OF PRESENT ILLNESS:      The patient is a 45 y.o.  6 parity 3 at 35.5 weeks. Patient presents with a chief complaint as above and is being admitted for monitoring after her office visit today. She has a nice reactive NST here and normal BPs      OB History    Para Term  AB Living   6 3 3 0 2 3   SAB TAB Ectopic Molar Multiple Live Births   2 0 0 0 0 3      # Outcome Date GA Lbr Shilo/2nd Weight Sex Delivery Anes PTL Lv   6 Current            5 SAB 2020           4 Term 17 37w0d  6 lb 5 oz (2.863 kg) M Vag-Spont   WIL   3 Term 03/21/15 37w1d  7 lb 14 oz (3.572 kg) M Vag-Spont EPI N WIL   2 SAB 12 8w0d          1 Term 06 40w0d 01:00 9 lb 8 oz (4.309 kg) M Vag-Spont EPI N WIL     Past Medical History:        Diagnosis Date    Diabetes mellitus ([facility] [address]     gestational on metformin    Obesity complicating pregnancy, childbirth, or puerperium, antepartum 2014    PIH (pregnancy induced hypertension) 3/21/2015    Traumatic injury during pregnancy      Past Surgical History:        Procedure Laterality Date    DILATION AND CURETTAGE      WISDOM TOOTH EXTRACTION       Social History:    TOBACCO:   reports that she has never smoked. She has never used smokeless tobacco.  ETOH:   reports no history of alcohol use. Family History:   History reviewed. No pertinent family history. Medications Prior to Admission:  Medications Prior to Admission: metFORMIN (GLUCOPHAGE) 500 MG tablet, Take 1 tablet by mouth 2 times daily (with meals)  Blood Glucose Monitoring Suppl (ONE TOUCH ULTRA 2) w/Device KIT, use as directed  valACYclovir (VALTREX) 500 MG tablet, Take 1 tablet by mouth 2 times daily  blood glucose monitor kit and supplies, Use as directed. blood glucose monitor strips, Test glucose 4 times a day.   Lancets MISC, Test glucose 4 times a day. doxylamine-pyridoxine 10-10 MG TBEC, Take 10 mg by mouth 2 times daily as needed (nausea and vomting) This medicine is safe in pregnancy. aspirin 81 MG EC tablet, Take 81 mg by mouth daily  Prenatal Vit-Fe Fumarate-FA (PRENATAL VITAMIN) CHEW, Take 1 tablet by mouth daily  metroNIDAZOLE (METROGEL VAGINAL) 0.75 % vaginal gel, Insert one applicator at night times 5 nights. Allergies:  Patient has no known allergies.       PHYSICAL EXAM:    General appearance:  awake, alert, cooperative, no apparent distress, and appears stated age  Neurologic:  Normal DTRs  Lungs:  No increased work of breathing, good air exchange, clear to auscultation bilaterally, no crackles or wheezing  Heart:  Normal apical impulse, regular rate and rhythm, normal S1 and S2, no S3 or S4, and no murmur noted  Abdomen:  Gravid, soft, non-tender  Fetal heart rate:  Baseline Heart Rate 145, accelerations:  present    Contraction frequency:  0 minutes  Membranes:  Intact      ASSESSMENT AND PLAN:    The patient is a 45 y.o.  6 parity 3 at 35.3 weeks    Normal NST  Home    Bladimir Roy MD  2021

## 2021-08-25 ENCOUNTER — ANESTHESIA EVENT (OUTPATIENT)
Dept: MATERNITY | Age: 41
DRG: 560 | End: 2021-08-25
Payer: COMMERCIAL

## 2021-08-25 ENCOUNTER — ANESTHESIA (OUTPATIENT)
Dept: MATERNITY | Age: 41
DRG: 560 | End: 2021-08-25
Payer: COMMERCIAL

## 2021-08-25 ENCOUNTER — HOSPITAL ENCOUNTER (INPATIENT)
Age: 41
LOS: 2 days | Discharge: HOME | DRG: 560 | End: 2021-08-27
Attending: OBSTETRICS & GYNECOLOGY | Admitting: OBSTETRICS & GYNECOLOGY
Payer: COMMERCIAL

## 2021-08-25 PROBLEM — R10.9 ABDOMINAL CRAMPING: Status: ACTIVE | Noted: 2021-08-25

## 2021-08-25 LAB
ABO/RH: NORMAL
AMPHETAMINE SCREEN, URINE: NOT DETECTED
ANTIBODY SCREEN: NORMAL
BARBITURATE SCREEN URINE: NOT DETECTED
BENZODIAZEPINE SCREEN, URINE: NOT DETECTED
CANNABINOID SCREEN URINE: NOT DETECTED
COCAINE METABOLITE SCREEN URINE: NOT DETECTED
FENTANYL SCREEN, URINE: NOT DETECTED
HCT VFR BLD CALC: 31 % (ref 34–48)
HEMOGLOBIN: 10.1 G/DL (ref 11.5–15.5)
Lab: NORMAL
MCH RBC QN AUTO: 32 PG (ref 26–35)
MCHC RBC AUTO-ENTMCNC: 32.6 % (ref 32–34.5)
MCV RBC AUTO: 98.1 FL (ref 80–99.9)
METHADONE SCREEN, URINE: NOT DETECTED
OPIATE SCREEN URINE: NOT DETECTED
OXYCODONE URINE: NOT DETECTED
PDW BLD-RTO: 13.2 FL (ref 11.5–15)
PHENCYCLIDINE SCREEN URINE: NOT DETECTED
PLATELET # BLD: 284 E9/L (ref 130–450)
PMV BLD AUTO: 9.8 FL (ref 7–12)
RBC # BLD: 3.16 E12/L (ref 3.5–5.5)
WBC # BLD: 8.1 E9/L (ref 4.5–11.5)

## 2021-08-25 PROCEDURE — 1220000000 HC SEMI PRIVATE OB R&B

## 2021-08-25 PROCEDURE — 3700000025 EPIDURAL BLOCK: Performed by: ANESTHESIOLOGY

## 2021-08-25 PROCEDURE — 7200000001 HC VAGINAL DELIVERY

## 2021-08-25 PROCEDURE — 86901 BLOOD TYPING SEROLOGIC RH(D): CPT

## 2021-08-25 PROCEDURE — 80307 DRUG TEST PRSMV CHEM ANLYZR: CPT

## 2021-08-25 PROCEDURE — 2580000003 HC RX 258: Performed by: NURSE PRACTITIONER

## 2021-08-25 PROCEDURE — 99231 SBSQ HOSP IP/OBS SF/LOW 25: CPT | Performed by: NURSE PRACTITIONER

## 2021-08-25 PROCEDURE — 6360000002 HC RX W HCPCS: Performed by: NURSE PRACTITIONER

## 2021-08-25 PROCEDURE — 86850 RBC ANTIBODY SCREEN: CPT

## 2021-08-25 PROCEDURE — 2500000003 HC RX 250 WO HCPCS: Performed by: ANESTHESIOLOGY

## 2021-08-25 PROCEDURE — 88307 TISSUE EXAM BY PATHOLOGIST: CPT

## 2021-08-25 PROCEDURE — 85027 COMPLETE CBC AUTOMATED: CPT

## 2021-08-25 PROCEDURE — 0HQ9XZZ REPAIR PERINEUM SKIN, EXTERNAL APPROACH: ICD-10-PCS | Performed by: OBSTETRICS & GYNECOLOGY

## 2021-08-25 PROCEDURE — 51701 INSERT BLADDER CATHETER: CPT

## 2021-08-25 PROCEDURE — 36415 COLL VENOUS BLD VENIPUNCTURE: CPT

## 2021-08-25 PROCEDURE — 86900 BLOOD TYPING SEROLOGIC ABO: CPT

## 2021-08-25 PROCEDURE — 6370000000 HC RX 637 (ALT 250 FOR IP): Performed by: OBSTETRICS & GYNECOLOGY

## 2021-08-25 RX ORDER — SODIUM CHLORIDE, SODIUM LACTATE, POTASSIUM CHLORIDE, CALCIUM CHLORIDE 600; 310; 30; 20 MG/100ML; MG/100ML; MG/100ML; MG/100ML
INJECTION, SOLUTION INTRAVENOUS CONTINUOUS
Status: DISCONTINUED | OUTPATIENT
Start: 2021-08-25 | End: 2021-08-27 | Stop reason: HOSPADM

## 2021-08-25 RX ORDER — HYDROCODONE BITARTRATE AND ACETAMINOPHEN 5; 325 MG/1; MG/1
1 TABLET ORAL EVERY 4 HOURS PRN
Status: DISCONTINUED | OUTPATIENT
Start: 2021-08-25 | End: 2021-08-27 | Stop reason: HOSPADM

## 2021-08-25 RX ORDER — SODIUM CHLORIDE 0.9 % (FLUSH) 0.9 %
5-40 SYRINGE (ML) INJECTION EVERY 12 HOURS SCHEDULED
Status: DISCONTINUED | OUTPATIENT
Start: 2021-08-25 | End: 2021-08-27 | Stop reason: HOSPADM

## 2021-08-25 RX ORDER — IBUPROFEN 800 MG/1
800 TABLET ORAL EVERY 8 HOURS
Status: DISCONTINUED | OUTPATIENT
Start: 2021-08-25 | End: 2021-08-27 | Stop reason: HOSPADM

## 2021-08-25 RX ORDER — SODIUM CHLORIDE 0.9 % (FLUSH) 0.9 %
5-40 SYRINGE (ML) INJECTION PRN
Status: DISCONTINUED | OUTPATIENT
Start: 2021-08-25 | End: 2021-08-27 | Stop reason: HOSPADM

## 2021-08-25 RX ORDER — MODIFIED LANOLIN
OINTMENT (GRAM) TOPICAL PRN
Status: DISCONTINUED | OUTPATIENT
Start: 2021-08-25 | End: 2021-08-27 | Stop reason: HOSPADM

## 2021-08-25 RX ORDER — NALBUPHINE HCL 10 MG/ML
5 AMPUL (ML) INJECTION EVERY 4 HOURS PRN
Status: DISCONTINUED | OUTPATIENT
Start: 2021-08-25 | End: 2021-08-25

## 2021-08-25 RX ORDER — ACETAMINOPHEN 650 MG
TABLET, EXTENDED RELEASE ORAL
Status: COMPLETED
Start: 2021-08-25 | End: 2021-08-25

## 2021-08-25 RX ORDER — SODIUM CHLORIDE, SODIUM LACTATE, POTASSIUM CHLORIDE, CALCIUM CHLORIDE 600; 310; 30; 20 MG/100ML; MG/100ML; MG/100ML; MG/100ML
INJECTION, SOLUTION INTRAVENOUS CONTINUOUS
Status: DISCONTINUED | OUTPATIENT
Start: 2021-08-25 | End: 2021-08-25

## 2021-08-25 RX ORDER — DOCUSATE SODIUM 100 MG/1
100 CAPSULE, LIQUID FILLED ORAL 2 TIMES DAILY
Status: DISCONTINUED | OUTPATIENT
Start: 2021-08-25 | End: 2021-08-27 | Stop reason: HOSPADM

## 2021-08-25 RX ORDER — SIMETHICONE 80 MG
80 TABLET,CHEWABLE ORAL 4 TIMES DAILY
Status: DISCONTINUED | OUTPATIENT
Start: 2021-08-25 | End: 2021-08-27 | Stop reason: HOSPADM

## 2021-08-25 RX ORDER — PRENATAL WITH FERROUS FUM AND FOLIC ACID 3080; 920; 120; 400; 22; 1.84; 3; 20; 10; 1; 12; 200; 27; 25; 2 [IU]/1; [IU]/1; MG/1; [IU]/1; MG/1; MG/1; MG/1; MG/1; MG/1; MG/1; UG/1; MG/1; MG/1; MG/1; MG/1
1 TABLET ORAL DAILY
Status: DISCONTINUED | OUTPATIENT
Start: 2021-08-26 | End: 2021-08-27 | Stop reason: HOSPADM

## 2021-08-25 RX ORDER — LIDOCAINE HYDROCHLORIDE 10 MG/ML
INJECTION, SOLUTION INFILTRATION; PERINEURAL
Status: DISCONTINUED
Start: 2021-08-25 | End: 2021-08-25

## 2021-08-25 RX ORDER — BISACODYL 10 MG
10 SUPPOSITORY, RECTAL RECTAL PRN
Status: DISCONTINUED | OUTPATIENT
Start: 2021-08-25 | End: 2021-08-27 | Stop reason: HOSPADM

## 2021-08-25 RX ORDER — SODIUM CHLORIDE 9 MG/ML
25 INJECTION, SOLUTION INTRAVENOUS PRN
Status: DISCONTINUED | OUTPATIENT
Start: 2021-08-25 | End: 2021-08-27 | Stop reason: HOSPADM

## 2021-08-25 RX ORDER — SODIUM CHLORIDE, SODIUM LACTATE, POTASSIUM CHLORIDE, AND CALCIUM CHLORIDE .6; .31; .03; .02 G/100ML; G/100ML; G/100ML; G/100ML
1000 INJECTION, SOLUTION INTRAVENOUS PRN
Status: DISCONTINUED | OUTPATIENT
Start: 2021-08-25 | End: 2021-08-25

## 2021-08-25 RX ORDER — FERROUS SULFATE 325(65) MG
325 TABLET ORAL 2 TIMES DAILY WITH MEALS
Status: DISCONTINUED | OUTPATIENT
Start: 2021-08-26 | End: 2021-08-27 | Stop reason: HOSPADM

## 2021-08-25 RX ORDER — SODIUM CHLORIDE, SODIUM LACTATE, POTASSIUM CHLORIDE, AND CALCIUM CHLORIDE .6; .31; .03; .02 G/100ML; G/100ML; G/100ML; G/100ML
500 INJECTION, SOLUTION INTRAVENOUS PRN
Status: DISCONTINUED | OUTPATIENT
Start: 2021-08-25 | End: 2021-08-25

## 2021-08-25 RX ORDER — ONDANSETRON 2 MG/ML
4 INJECTION INTRAMUSCULAR; INTRAVENOUS EVERY 6 HOURS PRN
Status: DISCONTINUED | OUTPATIENT
Start: 2021-08-25 | End: 2021-08-25

## 2021-08-25 RX ORDER — NALOXONE HYDROCHLORIDE 0.4 MG/ML
0.4 INJECTION, SOLUTION INTRAMUSCULAR; INTRAVENOUS; SUBCUTANEOUS PRN
Status: DISCONTINUED | OUTPATIENT
Start: 2021-08-25 | End: 2021-08-25

## 2021-08-25 RX ORDER — HYDROCODONE BITARTRATE AND ACETAMINOPHEN 5; 325 MG/1; MG/1
2 TABLET ORAL EVERY 4 HOURS PRN
Status: DISCONTINUED | OUTPATIENT
Start: 2021-08-25 | End: 2021-08-27 | Stop reason: HOSPADM

## 2021-08-25 RX ORDER — ACETAMINOPHEN 325 MG/1
650 TABLET ORAL EVERY 4 HOURS PRN
Status: DISCONTINUED | OUTPATIENT
Start: 2021-08-25 | End: 2021-08-27 | Stop reason: HOSPADM

## 2021-08-25 RX ADMIN — Medication 1 MILLI-UNITS/MIN: at 13:30

## 2021-08-25 RX ADMIN — IBUPROFEN 800 MG: 800 TABLET, FILM COATED ORAL at 20:18

## 2021-08-25 RX ADMIN — SODIUM CHLORIDE, POTASSIUM CHLORIDE, SODIUM LACTATE AND CALCIUM CHLORIDE: 600; 310; 30; 20 INJECTION, SOLUTION INTRAVENOUS at 13:00

## 2021-08-25 RX ADMIN — Medication 15 ML/HR: at 14:11

## 2021-08-25 RX ADMIN — Medication 166.7 ML: at 18:20

## 2021-08-25 RX ADMIN — Medication 87.3 MILLI-UNITS/MIN: at 18:31

## 2021-08-25 RX ADMIN — Medication: at 18:14

## 2021-08-25 ASSESSMENT — PAIN SCALES - GENERAL: PAINLEVEL_OUTOF10: 10

## 2021-08-25 NOTE — PROGRESS NOTES
M Note    I was asked to see the patient by nursing as her primary OB was en route to the hospital.  She was complete with the urge to push. On exam, she was complete and +3 station. She was very uncomfortable and had the urge to push. The patient's perineum was cleansed with betadine. Good fetal descent was noted with maternal pushing. The fetal head delivered, a nuchal cord was noted x1. It was easily reduced on the perineum. With the next set of maternal pushing efforts the  delivered atraumatically. The  was placed on the maternal abdomen. Cord clamping was delayed x60 seconds. The cord was clamped and cut. Dr. Tierra Miguel then came into the room and assumed care of the patient. Cord gases were collected.

## 2021-08-25 NOTE — ANESTHESIA PROCEDURE NOTES
Epidural Block    Patient location during procedure: OB  Reason for block: labor epidural  Staffing  Performed: resident/CRNA   Anesthesiologist: Troy Larios MD  Resident/CRNA: BARBIE Hernandez CRNA  Preanesthetic Checklist  Completed: patient identified, IV checked, site marked, risks and benefits discussed, surgical consent, monitors and equipment checked, pre-op evaluation, timeout performed, anesthesia consent given, oxygen available and patient being monitored  Epidural  Patient position: sitting  Prep: ChloraPrep  Patient monitoring: cardiac monitor, continuous pulse ox and frequent blood pressure checks  Approach: midline  Location: lumbar (1-5)  Injection technique: hanging drop  Provider prep: mask and sterile gloves  Needle  Needle type: Tuohy   Needle gauge: 18 G  Needle length: 3.5 in  Needle insertion depth: 10 cm  Catheter type: end hole  Catheter size: 20 G.   Catheter at skin depth: 17 cm  Test dose: negative  Assessment  Hemodynamics: stable

## 2021-08-25 NOTE — PROGRESS NOTES
of viable baby girl @ 18. APGARS 8/9. Nuchal x1. Cord gases obtained. Delayed cord clamping.  Nuchal x1

## 2021-08-25 NOTE — ANESTHESIA PRE PROCEDURE
Department of Anesthesiology  Preprocedure Note       Name:  Alexandro Romero   Age:  45 y.o.  :  1980                                          MRN:  [id number]         Date:  2021      Surgeon: * No surgeons listed *    Procedure: * No procedures listed *    Medications prior to admission:   Prior to Admission medications    Medication Sig Start Date End Date Taking? Authorizing Provider   Blood Glucose Monitoring Suppl (ONE TOUCH ULTRA 2) w/Device KIT use as directed 21   Historical Provider, MD   valACYclovir (VALTREX) 500 MG tablet Take 1 tablet by mouth 2 times daily 21   ARTHUR Burden   metFORMIN (GLUCOPHAGE) 500 MG tablet Take 1 tablet by mouth 2 times daily (with meals) 21   BARBIE Norwood CNM   blood glucose monitor kit and supplies Use as directed. 21   Bruna Mora MD   blood glucose monitor strips Test glucose 4 times a day. 21   Bruna Mora MD   Lancets MISC Test glucose 4 times a day. 21   Bruna Mora MD   doxylamine-pyridoxine 10-10 MG TBEC Take 10 mg by mouth 2 times daily as needed (nausea and vomting) This medicine is safe in pregnancy. 21   Rayshawn Ortega MD   aspirin 81 MG EC tablet Take 81 mg by mouth daily    Historical Provider, MD   Prenatal Vit-Fe Fumarate-FA (PRENATAL VITAMIN) CHEW Take 1 tablet by mouth daily 3/24/21   Bruna Mora MD   metroNIDAZOLE (METROGEL VAGINAL) 0.75 % vaginal gel Insert one applicator at night times 5 nights.  21   Yuridia Marcos [facility]       Current medications:    Current Facility-Administered Medications   Medication Dose Route Frequency Provider Last Rate Last Admin    lactated ringers infusion   IntraVENous Continuous BARBIE Long  mL/hr at 21 1300 New Bag at 21 1300    lactated ringers bolus  500 mL IntraVENous PRN BARBIE Long CNP        Or    lactated ringers bolus  1,000 mL IntraVENous PRN Karen Hoffman APRN - CNP       Lavon Araujo oxytocin (PITOCIN) 30 units in 500 mL infusion  1 felecia-units/min IntraVENous Continuous Cherre Stai, APRN - CNP 1 mL/hr at 08/25/21 1330 1 felecia-units/min at 08/25/21 1330    oxytocin (PITOCIN) 30 units in 500 mL infusion  87.3 felecia-units/min IntraVENous Continuous PRN Cherre Stai, APRN - CNP        And    oxytocin (PITOCIN) 10 unit bolus from the bag  10 Units IntraVENous PRN Cherre Stai, APRN - CNP        ondansetron [facility]) injection 4 mg  4 mg IntraVENous Q6H PRN Cherre Stai, APRN - CNP        povidone-iodine (BETADINE) 10 % external solution             lidocaine 1 % injection             naloxone (NARCAN) injection 0.4 mg  0.4 mg IntraVENous PRN Vaibhav Sims MD        nalbuphine (NUBAIN) injection 5 mg  5 mg IntraVENous Q4H PRN Vaibhav Sims MD        ondansetron [facility]) injection 4 mg  4 mg IntraVENous Q6H PRN Vaibhav Sims MD        fentaNYL 1.85mcg/ml and Bupivicaine 0.1% in 0.9% NS 135ml infusion (OB) epidural  15 mL/hr Epidural Continuous Vaibhav Sims MD           Allergies:  No Known Allergies    Problem List:    Patient Active Problem List   Diagnosis Code    Obesity complicating pregnancy, childbirth, or puerperium, antepartum O99.210    AMA (advanced maternal age) multigravida 33+ O12.46    H/O pre-eclampsia in prior pregnancy, currently pregnant O09.299    Pelvic pressure in pregnancy O26.899, R10.2    Elderly multigravida O09.529    Obesity during third trimester, antepartum O99.213    Herpes simplex B00.9    Spotting affecting pregnancy in second trimester O26.852    Hypertension I10    Decreased fetus movements affecting management of mother in third trimester O42.0    Decreased fetal movement affecting management of mother, antepartum O40.1    Abdominal cramping R10.9       Past Medical History:        Diagnosis Date    Diabetes mellitus ([facility] [address]     gestational on metformin    Obesity complicating pregnancy, childbirth, or puerperium, antepartum 12/29/2014    PIH (pregnancy induced hypertension) 3/21/2015    Traumatic injury during pregnancy        Past Surgical History:        Procedure Laterality Date    DILATION AND CURETTAGE  2010    WISDOM TOOTH EXTRACTION  2014       Social History:    Social History     Tobacco Use    Smoking status: Never Smoker    Smokeless tobacco: Never Used   Substance Use Topics    Alcohol use: No                                Counseling given: Not Answered      Vital Signs (Current):   Vitals:    08/25/21 1209 08/25/21 1212   BP:  (!) 143/83   Pulse:  78   Resp:  16   Temp:  37.2 °C (98.9 °F)   TempSrc:  Oral   Weight: 256 lb (116.1 kg)    Height: 5' 10\" (1.778 m)                                               BP Readings from Last 3 Encounters:   08/25/21 (!) 143/83   08/23/21 130/86   08/16/21 130/80       NPO Status: Time of last liquid consumption: 0430                        Time of last solid consumption: 0430                        Date of last liquid consumption: 08/25/21                        Date of last solid food consumption: 08/25/21    BMI:   Wt Readings from Last 3 Encounters:   08/25/21 256 lb (116.1 kg)   08/23/21 (!) 357 lb (161.9 kg)   08/16/21 (!) 354 lb (160.6 kg)     Body mass index is 36.73 kg/m².     CBC:   Lab Results   Component Value Date    WBC 8.1 08/25/2021    RBC 3.16 08/25/2021    RBC 3.24 06/17/2021    HGB 10.1 08/25/2021    HCT 31.0 08/25/2021    MCV 98.1 08/25/2021    RDW 13.2 08/25/2021     08/25/2021       CMP:   Lab Results   Component Value Date     07/23/2021    K 3.7 07/23/2021     07/23/2021    CO2 23 07/23/2021    BUN 9 07/23/2021    CREATININE 0.6 07/23/2021    GFRAA >60 07/23/2021    LABGLOM >60 07/23/2021    GLUCOSE 76 07/23/2021    PROT 6.7 07/23/2021    CALCIUM 8.6 07/23/2021    BILITOT 0.4 07/23/2021    ALKPHOS 113 07/23/2021    AST 21 07/23/2021    ALT 13 07/23/2021       POC Tests: No results for input(s): POCGLU, POCNA, POCK, POCCL, POCBUN, POCHEMO, POCHCT in the last 72 hours. Coags:   Lab Results   Component Value Date    PROTIME 10.8 07/02/2017    INR 1.0 07/02/2017    APTT 27.9 07/02/2017       HCG (If Applicable):   Lab Results   Component Value Date    PREGTESTUR positive 09/02/2020    HCG <5 11/05/2020        ABGs: No results found for: PHART, PO2ART, JVS4AIT, FKX3QUS, BEART, B9HNWJRD     Type & Screen (If Applicable):  Lab Results   Component Value Date    LABABO B 02/19/2021    [address] POSITIVE 02/19/2021       Drug/Infectious Status (If Applicable):  No results found for: HIV, HEPCAB    COVID-19 Screening (If Applicable):   Lab Results   Component Value Date    COVID19 Negative 08/16/2021           Anesthesia Evaluation   no history of anesthetic complications:   Airway: Mallampati: II  TM distance: >3 FB   Neck ROM: full  Mouth opening: > = 3 FB Dental: normal exam         Pulmonary:Negative Pulmonary ROS and normal exam  breath sounds clear to auscultation                             Cardiovascular:    (+) hypertension:,         Rhythm: regular  Rate: normal                    Neuro/Psych:   Negative Neuro/Psych ROS              GI/Hepatic/Renal: Neg GI/Hepatic/Renal ROS            Endo/Other:    (+) Diabetes, . Abdominal:             Vascular: negative vascular ROS. Other Findings:             Anesthesia Plan      epidural     ASA 2             Anesthetic plan and risks discussed with patient.                       Cain Leal, APRN - CRNA   8/25/2021

## 2021-08-25 NOTE — H&P
Department of Obstetrics and Gynecology  Nurse Practitioner Obstetrics History and Physical        CHIEF COMPLAINT:  Contractions    HISTORY OF PRESENT ILLNESS:    The patient is a 45 y.o. female [id number], Patient's last menstrual period was 2020.,  at 38w5d. Pt presents to l&d with complaints of ctx.  Pt denies lof, vb, or decreased fm.  gbs negative  OB History        6    Para   3    Term   3       0    AB   2    Living   3       SAB   2    TAB   0    Ectopic   0    Molar   0    Multiple   0    Live Births   3                Estimated Due Date: Estimated Date of Delivery: 9/3/21    PRENATAL CARE:  uneventful    Complicated by:   Patient Active Problem List   Diagnosis Code    Obesity complicating pregnancy, childbirth, or puerperium, antepartum O99.210    AMA (advanced maternal age) multigravida 33+ O12.46   [facility] H/O pre-eclampsia in prior pregnancy, currently pregnant O09.299    Pelvic pressure in pregnancy O26.899, R10.2    Elderly multigravida O09.529    Obesity during third trimester, antepartum O99.213    Herpes simplex B00.9    Spotting affecting pregnancy in second trimester O26.852    Hypertension I10    Decreased fetus movements affecting management of mother in third trimester O42.0    Decreased fetal movement affecting management of mother, antepartum O40.1    Abdominal cramping R10.9       PAST OB HISTORY  OB History        6    Para   3    Term   3       0    AB   2    Living   3       SAB   2    TAB   0    Ectopic   0    Molar   0    Multiple   0    Live Births   3                Past Medical History:        Diagnosis Date    Diabetes mellitus ([address]     gestational on metformin    Obesity complicating pregnancy, childbirth, or puerperium, antepartum 2014    PIH (pregnancy induced hypertension) 3/21/2015    Traumatic injury during pregnancy      Past Surgical History:        Procedure Laterality Date    DILATION AND CURETTAGE      MICHAEL TOOTH EXTRACTION  2014     Social History:    TOBACCO:   reports that she has never smoked. She has never used smokeless tobacco.  ETOH:   reports no history of alcohol use. DRUGS:   reports no history of drug use. Family History:   No family history on file. Medications Prior to Admission:  Medications Prior to Admission: Blood Glucose Monitoring Suppl (ONE TOUCH ULTRA 2) w/Device KIT, use as directed  valACYclovir (VALTREX) 500 MG tablet, Take 1 tablet by mouth 2 times daily  metFORMIN (GLUCOPHAGE) 500 MG tablet, Take 1 tablet by mouth 2 times daily (with meals)  blood glucose monitor kit and supplies, Use as directed. blood glucose monitor strips, Test glucose 4 times a day. Lancets MISC, Test glucose 4 times a day. doxylamine-pyridoxine 10-10 MG TBEC, Take 10 mg by mouth 2 times daily as needed (nausea and vomting) This medicine is safe in pregnancy. aspirin 81 MG EC tablet, Take 81 mg by mouth daily  Prenatal Vit-Fe Fumarate-FA (PRENATAL VITAMIN) CHEW, Take 1 tablet by mouth daily  metroNIDAZOLE (METROGEL VAGINAL) 0.75 % vaginal gel, Insert one applicator at night times 5 nights. Allergies:  Patient has no known allergies. Review of Systems:   Ears, nose, mouth, throat, and face: negative  Respiratory: negative  Cardiovascular: negative  Gastrointestinal: negative  Genitourinary:negative  Integument/breast: negative  Hematologic/lymphatic: negative  Musculoskeletal:negative  Neurological: negative  Behavioral/Psych: negative  Endocrine: negative  Allergic/Immunologic: negative  Psychosocial: negative    PHYSICAL EXAM:    General appearance:  awake, alert, cooperative, no apparent distress, and appears stated age  Neurologic:  Awake, alert, oriented to name, place and time. Cranial nerves II-XII are grossly intact.     Lungs:  clear to auscultation bilaterally  Heart:  regular rate and rhythm  Abdomen:   soft, non-distended, non-tender, no masses palpated, gravid  Fetal heart rate:  Baseline Heart Rate 140, accelerations:  present, decels:none  Pelvis:  External Genitalia: no lesions  Cervix:  DILATION:  4 cm  EFFACEMENT:   70%  STATION:  -3  CONSISTENCY:  soft    Contraction frequency:  irregular minutes  Membranes:  Intact    BP (!) 143/83   Pulse 78   Temp 98.9 °F (37.2 °C)   Resp 16   Ht 5' 10\" (1.778 m)   Wt 256 lb (116.1 kg)   LMP 11/27/2020   BMI 36.73 kg/m²                 Prenatal Labs  Blood Type/Rh: B pos  Antibody Screen: negative  Rubella: immune  T.  Pallidum, IgG: non-reactive   Hepatitis B Surface Antigen: non-reactive   HIV: non-reactive   Sickle Cell Screen: not available  Gonorrhea: negative  Chlamydia: negative  Group B Strep: negative  covid screening-negative      ASSESSMENT AND PLAN:  D/w Dr. Silverio Mariano  Routine admission orders  Pitocin augmentation  Epidural upon request        Electronically signed by BARBIE Martinez CNP on 8/25/2021 at 12:31 PM

## 2021-08-25 NOTE — PROGRESS NOTES
Called via answering service at [phone] noting pt is complete and pushing with  epidural and uncomfortable  Pt last examined at 1730 and noted to be 5 cm  Arrived at [address] s/p  by dr Francis Sood covering for [place]

## 2021-08-25 NOTE — PROGRESS NOTES
Pt , 38w5d presents for ctx that began at 0630. Denies vb, lof. +FM. Pt placed on EFM.  Call light within reach

## 2021-08-26 LAB
6-MAM, QUANTITATIVE, URINE: <10
AMPHETAMINE SCREEN, URINE: NOT DETECTED
BARBITURATE SCREEN URINE: NOT DETECTED
BENZODIAZEPINE SCREEN, URINE: NOT DETECTED
CANNABINOID SCREEN URINE: NOT DETECTED
COCAINE METABOLITE SCREEN URINE: NOT DETECTED
CODEINE, QUANTITATIVE, URINE: <50
COMMENT: NORMAL
FENTANYL SCREEN, URINE: NOT DETECTED
HCT VFR BLD CALC: 28.6 % (ref 34–48)
HEMOGLOBIN: 9.3 G/DL (ref 11.5–15.5)
HYDROCODONE, QUANTITATIVE, URINE: <50
HYDROMORPHONE, QUANTITATIVE, URINE: <50
Lab: NORMAL
METHADONE SCREEN, URINE: NOT DETECTED
MORPHINE, QUANTITATIVE, URINE: <50
NORHYDROCODONE, QUANTITATIVE, URINE: <50
NOROXYCODONE, QUANTITATIVE, URINE: <50
OPIATE SCREEN URINE: NOT DETECTED
OXYCODONE URINE, QUANTITATIVE: <50
OXYCODONE URINE: NOT DETECTED
OXYMORPHONE, QUANTITATIVE, URINE: <50
PHENCYCLIDINE SCREEN URINE: NOT DETECTED

## 2021-08-26 PROCEDURE — 36415 COLL VENOUS BLD VENIPUNCTURE: CPT

## 2021-08-26 PROCEDURE — 1220000000 HC SEMI PRIVATE OB R&B

## 2021-08-26 PROCEDURE — 6370000000 HC RX 637 (ALT 250 FOR IP): Performed by: OBSTETRICS & GYNECOLOGY

## 2021-08-26 PROCEDURE — 85014 HEMATOCRIT: CPT

## 2021-08-26 PROCEDURE — 85018 HEMOGLOBIN: CPT

## 2021-08-26 RX ORDER — IBUPROFEN 800 MG/1
800 TABLET ORAL EVERY 8 HOURS
Qty: 30 TABLET | Refills: 0 | Status: SHIPPED | OUTPATIENT
Start: 2021-08-26 | End: 2021-09-30 | Stop reason: ALTCHOICE

## 2021-08-26 RX ADMIN — IBUPROFEN 800 MG: 800 TABLET, FILM COATED ORAL at 18:34

## 2021-08-26 RX ADMIN — IBUPROFEN 800 MG: 800 TABLET, FILM COATED ORAL at 05:11

## 2021-08-26 RX ADMIN — FERROUS SULFATE TAB 325 MG (65 MG ELEMENTAL FE) 325 MG: 325 (65 FE) TAB at 10:12

## 2021-08-26 RX ADMIN — DOCUSATE SODIUM 100 MG: 100 CAPSULE ORAL at 20:10

## 2021-08-26 RX ADMIN — METFORMIN HYDROCHLORIDE 1 TABLET: 500 TABLET, EXTENDED RELEASE ORAL at 10:12

## 2021-08-26 RX ADMIN — HYDROCODONE BITARTRATE AND ACETAMINOPHEN 1 TABLET: 5; 325 TABLET ORAL at 03:51

## 2021-08-26 RX ADMIN — DOCUSATE SODIUM 100 MG: 100 CAPSULE ORAL at 10:11

## 2021-08-26 RX ADMIN — FERROUS SULFATE TAB 325 MG (65 MG ELEMENTAL FE) 325 MG: 325 (65 FE) TAB at 18:34

## 2021-08-26 ASSESSMENT — PAIN SCALES - GENERAL
PAINLEVEL_OUTOF10: 6
PAINLEVEL_OUTOF10: 5
PAINLEVEL_OUTOF10: 6

## 2021-08-26 NOTE — PLAN OF CARE
Problem: Fluid Volume - Imbalance:  Goal: Absence of imbalanced fluid volume signs and symptoms  Description: Absence of imbalanced fluid volume signs and symptoms  Outcome: Met This Shift     Problem: Fluid Volume - Imbalance:  Goal: Absence of postpartum hemorrhage signs and symptoms  Description: Absence of postpartum hemorrhage signs and symptoms  8/26/2021 1338 by Leander Garcia RN  Outcome: Met This Shift     Problem: Pain - Acute:  Goal: Pain level will decrease  Description: Pain level will decrease  8/26/2021 1338 by Leander Garcia RN  Outcome: Met This Shift     Problem: Pain - Acute:  Goal: Able to cope with pain  Description: Able to cope with pain  Outcome: Met This Shift     Problem: Pain:  Description: Pain management should include both nonpharmacologic and pharmacologic interventions.   Goal: Pain level will decrease  Description: Pain level will decrease  8/26/2021 1338 by Leander Garcia RN  Outcome: Met This Shift     Problem: Infection - Risk of, Puerperal Infection:  Goal: Will show no infection signs and symptoms  Description: Will show no infection signs and symptoms  Outcome: Met This Shift     Problem: Mood - Altered:  Goal: Mood stable  Description: Mood stable  8/26/2021 1338 by Leander Garcia RN  Outcome: Met This Shift

## 2021-08-26 NOTE — PROGRESS NOTES
Postpartum Day 1: Vaginal Delivery    The patient feels well. Pain is well controlled with current medications. Baby is feeding via breast.     Objective:        Vitals:    08/26/21 0022   BP: 138/66   Pulse: 93   Resp: 20   Temp: 97.8 °F (36.6 °C)   SpO2:          Lab Results   Component Value Date    WBC 8.1 08/25/2021    HGB 9.3 (L) 08/26/2021    HCT 28.6 (L) 08/26/2021    MCV 98.1 08/25/2021     08/25/2021       General:    alert, appears stated age and cooperative   Lochia:  appropriate   Uterine    firm   DVT Evaluation:  No evidence of DVT seen on physical exam.     Assessment:     Status post Vaginal Delivery. good    Plan:     Continue current care.

## 2021-08-26 NOTE — PLAN OF CARE
Problem: Anxiety:  Goal: Level of anxiety will decrease  Description: Level of anxiety will decrease  8/26/2021 0649 by Ed Akbar RN  Outcome: Completed  8/25/2021 1759 by Savannah Leslie RN  Outcome: Ongoing     Problem: Breathing Pattern - Ineffective:  Goal: Able to breathe comfortably  Description: Able to breathe comfortably  8/26/2021 0649 by Ed Akbar RN  Outcome: Completed  8/25/2021 1759 by Savannah Leslie RN  Outcome: Ongoing     Problem: Fluid Volume - Imbalance:  Goal: Absence of imbalanced fluid volume signs and symptoms  Description: Absence of imbalanced fluid volume signs and symptoms  8/26/2021 0649 by Ed Akbar RN  Outcome: Completed  8/25/2021 1759 by Savannah Leslie RN  Outcome: Ongoing     Problem: Fluid Volume - Imbalance:  Goal: Absence of intrapartum hemorrhage signs and symptoms  Description: Absence of intrapartum hemorrhage signs and symptoms  8/26/2021 0649 by Ed Akbar RN  Outcome: Completed  8/25/2021 1759 by Savannah Leslie RN  Outcome: Ongoing     Problem: Fluid Volume - Imbalance:  Goal: Absence of postpartum hemorrhage signs and symptoms  Description: Absence of postpartum hemorrhage signs and symptoms  Outcome: Met This Shift     Problem: Infection - Intrapartum Infection:  Goal: Will show no infection signs and symptoms  Description: Will show no infection signs and symptoms  8/26/2021 0649 by Ed Akbar RN  Outcome: Completed  8/25/2021 1759 by Savannah Leslie RN  Outcome: Ongoing     Problem: Labor Process - Prolonged:  Goal: Labor progression, first stage, within specified pattern  Description: Labor progression, first stage, within specified pattern  8/26/2021 0649 by Ed Akbar RN  Outcome: Completed  8/25/2021 1759 by Savannah Leslie RN  Outcome: Ongoing     Problem: Labor Process - Prolonged:  Goal: Labor progession, second stage, within specified pattern  Description: Labor progession, second stage, within specified pattern  8/26/2021 0016 by Aimee Crews RN  Outcome: Completed  2021 175 by Parish Servin RN  Outcome: Ongoing     Problem: Labor Process - Prolonged:  Goal: Uterine contractions within specified parameters  Description: Uterine contractions within specified parameters  2021 0649 by Aimee Crews RN  Outcome: Completed  2021 by Parish Servin RN  Outcome: Ongoing     Problem:  Screening:  Goal: Ability to make informed decisions regarding treatment has improved  Description: Ability to make informed decisions regarding treatment has improved  2021 0649 by Aimee Crews RN  Outcome: Completed  2021 175 by Parish Servin RN  Outcome: Ongoing     Problem: Pain - Acute:  Goal: Pain level will decrease  Description: Pain level will decrease  2021 0650 by Aimee Crews RN  Outcome: Ongoing  2021 by Parish Servin RN  Outcome: Ongoing     Problem: Pain - Acute:  Goal: Able to cope with pain  Description: Able to cope with pain  2021 by Parish Servin RN  Outcome: Ongoing     Problem: Tissue Perfusion - Uteroplacental, Altered:  Goal: Absence of abnormal fetal heart rate pattern  Description: Absence of abnormal fetal heart rate pattern  2021 0649 by Aimee Crews RN  Outcome: Completed  2021 by Parish Servin RN  Outcome: Ongoing     Problem: Urinary Retention:  Goal: Experiences of bladder distention will decrease  Description: Experiences of bladder distention will decrease  2021 0649 by Aimee Crews RN  Outcome: Completed  2021 by Parish Servin RN  Outcome: Ongoing     Problem: Urinary Retention:  Goal: Urinary elimination within specified parameters  Description: Urinary elimination within specified parameters  2021 0649 by Aimee Crews RN  Outcome: Completed  2021 by Parish Servin RN  Outcome: Ongoing     Problem: Pain:  Goal: Pain level will decrease  Description: Pain level will decrease  2021 0203 by Diamond Timmons RN  Outcome: Ongoing  8/25/2021 1759 by Sebastien Rodriguez RN  Outcome: Ongoing     Problem: Pain:  Goal: Control of acute pain  Description: Control of acute pain  8/25/2021 1759 by Sebastien Rodriguez RN  Outcome: Ongoing     Problem: Pain:  Goal: Control of chronic pain  Description: Control of chronic pain  8/25/2021 1759 by Sebastien Rodriguez RN  Outcome: Ongoing     Problem: Mood - Altered:  Goal: Mood stable  Description: Mood stable  Outcome: Ongoing

## 2021-08-26 NOTE — FLOWSHEET NOTE
This RN received a phone call from [employer] from toxicology department at [address] [place] [address] who reports that patient's +UDS for opiates from 1250 8/25/2021 was re-screened per policy and procedure. The re-screen of the 1250 8/25/21 UDS did not detect opiates. This RN called CUATE Valenzuela and left her a voicemail notifying her of all of the above.

## 2021-08-26 NOTE — FLOWSHEET NOTE
Pt admitted to room 317. Oriented to room, phones, call light and bedside educational material. Pt and FOB  informed of current visitation policy. Pt informed only one adult that was present at the delivery is allowed to stay/sleep in room overnight. Pt informed of pain medicine available as needed. Infant safety reviewed. Pt to call for assist to bathroom with first void. Pt states she received the Tdap vaccine during this pregnancy.

## 2021-08-26 NOTE — ANESTHESIA POSTPROCEDURE EVALUATION
Department of Anesthesiology  Postprocedure Note    Patient: Deb Chavira  MRN: [id number]  [place]: 1980  Date of evaluation: 8/26/2021  Time:  9:51 AM     Procedure Summary     Date: 08/25/21 Room / Location:     Anesthesia Start: 1400 Anesthesia Stop: 8201    Procedure: Labor Analgesia Diagnosis:     Scheduled Providers:  Responsible Provider: Jesus Rojas MD    Anesthesia Type: epidural ASA Status: 2          Anesthesia Type: epidural    Anish Phase I:      Anish Phase II:      Last vitals: Reviewed and per EMR flowsheets.        Anesthesia Post Evaluation    Patient location during evaluation: floor  Patient participation: complete - patient participated  Level of consciousness: awake and alert  Airway patency: patent  Nausea & Vomiting: no nausea and no vomiting  Complications: no  Cardiovascular status: blood pressure returned to baseline  Respiratory status: acceptable and room air  Hydration status: euvolemic

## 2021-08-27 VITALS
TEMPERATURE: 97.9 F | DIASTOLIC BLOOD PRESSURE: 89 MMHG | SYSTOLIC BLOOD PRESSURE: 136 MMHG | HEIGHT: 70 IN | HEART RATE: 95 BPM | BODY MASS INDEX: 36.65 KG/M2 | RESPIRATION RATE: 18 BRPM | OXYGEN SATURATION: 100 % | WEIGHT: 256 LBS

## 2021-08-27 PROCEDURE — 6370000000 HC RX 637 (ALT 250 FOR IP): Performed by: OBSTETRICS & GYNECOLOGY

## 2021-08-27 RX ADMIN — METFORMIN HYDROCHLORIDE 1 TABLET: 500 TABLET, EXTENDED RELEASE ORAL at 07:48

## 2021-08-27 RX ADMIN — IBUPROFEN 800 MG: 800 TABLET, FILM COATED ORAL at 07:47

## 2021-08-27 RX ADMIN — FERROUS SULFATE TAB 325 MG (65 MG ELEMENTAL FE) 325 MG: 325 (65 FE) TAB at 07:48

## 2021-08-27 RX ADMIN — DOCUSATE SODIUM 100 MG: 100 CAPSULE ORAL at 07:48

## 2021-08-27 ASSESSMENT — PAIN SCALES - GENERAL: PAINLEVEL_OUTOF10: 10

## 2021-08-27 NOTE — PROGRESS NOTES
Postpartum Day 2: Vaginal Delivery    The patient feels well. Pain is well controlled with current medications. Baby is feeding via breast.     Objective:        Vitals:    08/27/21 0906   BP: 136/89   Pulse: 95   Resp: 18   Temp: 97.9 °F (36.6 °C)   SpO2: 100%         Lab Results   Component Value Date    WBC 8.1 08/25/2021    HGB 9.3 (L) 08/26/2021    HCT 28.6 (L) 08/26/2021    MCV 98.1 08/25/2021     08/25/2021       General:    alert, appears stated age and cooperative   Lochia:  appropriate   Uterine    firm   DVT Evaluation:  No evidence of DVT seen on physical exam.     Assessment:     Status post Vaginal Delivery. good    Plan:     Continue current care.

## 2021-08-27 NOTE — CARE COORDINATION
SW Discharge Planning     SW spoke with Rosalba Rhodes as well as Willie Hope from toxicology, who reported that mother's opiate screen was a false positive. SW reached out to mother for support, who, being understandably upset, expressed understanding and was polite with SW.         PLAN    Baby CAN be discharged home to mother     Electronically signed by CUATE Rousseau on 8/27/2021 at 7:37 AM

## 2021-08-27 NOTE — PLAN OF CARE
Problem: Fluid Volume - Imbalance:  Goal: Absence of imbalanced fluid volume signs and symptoms  Description: Absence of imbalanced fluid volume signs and symptoms  8/27/2021 0043 by Shantel Zelaya RN  Outcome: Ongoing  8/26/2021 1338 by Haylee Dior RN  Outcome: Met This Shift  Goal: Absence of postpartum hemorrhage signs and symptoms  Description: Absence of postpartum hemorrhage signs and symptoms  8/27/2021 0043 by Shantel Zelaya RN  Outcome: Ongoing  8/26/2021 1338 by Haylee Dior RN  Outcome: Met This Shift     Problem: Pain - Acute:  Goal: Pain level will decrease  Description: Pain level will decrease  8/27/2021 0043 by Shantel Zelaya RN  Outcome: Ongoing  8/26/2021 1338 by Haylee Dior RN  Outcome: Met This Shift  Goal: Able to cope with pain  Description: Able to cope with pain  8/27/2021 0043 by Shantel Zelaya RN  Outcome: Ongoing  8/26/2021 1338 by Haylee Dior RN  Outcome: Met This Shift     Problem: Pain:  Goal: Pain level will decrease  Description: Pain level will decrease  8/27/2021 0043 by Shantel Zelaya RN  Outcome: Ongoing  8/26/2021 1338 by Haylee Dior RN  Outcome: Met This Shift  Goal: Control of acute pain  Description: Control of acute pain  Outcome: Ongoing  Goal: Control of chronic pain  Description: Control of chronic pain  Outcome: Ongoing     Problem: Discharge Planning:  Goal: Discharged to appropriate level of care  Description: Discharged to appropriate level of care  Outcome: Ongoing     Problem: Constipation:  Goal: Bowel elimination is within specified parameters  Description: Bowel elimination is within specified parameters  Outcome: Ongoing     Problem: Infection - Risk of, Puerperal Infection:  Goal: Will show no infection signs and symptoms  Description: Will show no infection signs and symptoms  8/27/2021 0043 by Shantel Zelaya RN  Outcome: Ongoing  8/26/2021 1338 by Haylee Dior RN  Outcome: Met This Shift     Problem: Mood - Altered:  Goal: Mood

## 2021-08-27 NOTE — DISCHARGE SUMMARY
Obstetrical Discharge Form        Patient ID:  Gurdeep Smith  [id number]  67 y.o.  1980    Admit date: 2021    Discharge date:      Admitting Physician: Tova Esquivel MD    Discharge Diagnoses: Abdominal cramping [R10.9]    Final Diagnosis:   Active Problems:    Abdominal cramping  Resolved Problems:    * No resolved hospital problems. *      Discharged Condition: good      Gestational Age:38w5d    Antepartum complications: none    Date of Delivery:  21    Type of Delivery: vaginal, spontaneous    Delivered By: Aliya Ramos         Baby:     Information for the patient's :  Low Sawyer [[id number]]          Anesthesia: Epidural    Intrapartum complications: None    Feeding method: breast    Hospital Course: Uneventful.   Patient recovered well without any issues     Discharged Condition: stable to home    Discharge Medication:    Nikole Friend   Home Medication Instructions SEK:[id number]    Printed on:21 1002   Medication Information                      ibuprofen (ADVIL;MOTRIN) 800 MG tablet  Take 1 tablet by mouth every 8 hours             Prenatal Vit-Fe Fumarate-FA (PRENATAL VITAMIN) CHEW  Take 1 tablet by mouth daily                  Postpartum complications: none    Note that over 30 minutes was spent in preparing discharge papers, discussing discharge with patient, medication review, etc.    Discharge Date:     Plan:   Follow up in 6 week(s)

## 2021-08-31 NOTE — PROGRESS NOTES
Vaginal Delivery Note    Details of Procedure: The patient is a 37 y.o. female at 44w7d   OB History        6    Para   4    Term   4       0    AB   2    Living   4       SAB   2    TAB   0    Ectopic   0    Molar   0    Multiple   0    Live Births   4             who was admitted for active phase labor. She received the following interventions: none She was known to be GBS negative and did not receive antibiotic prophylaxis. The patient progressed well,did receive an epidural, became complete and started to push. After pushing for twice  the fetal head was at the perineum, nose and mouth suctioned with bulb suction and the rest of the infant delivered atraumatically, placed on mother abdomen. Cord was clamped and cut and infant handed off to the waiting nurse for evaluation. The delivery of the placenta was spontaneous. The perineum and vagina were explored and a first degree laceration was repaired in standard fashion. Anesthesia:  epidural anesthesia    Estimated blood loss:  300ml    Specimen:  Placenta sent to pathology     Cord blood sent Yes    Complications:  none    Condition:  infant stable to general nursery    Berny Foreman MD M.D.  FACOG

## 2021-08-31 NOTE — L&D DELIVERY SUMMARY NOTE
Vaginal Delivery Note    Details of Procedure: The patient is a 37 y.o. female at 44w7d   OB History        6    Para   4    Term   4       0    AB   2    Living   4       SAB   2    TAB   0    Ectopic   0    Molar   0    Multiple   0    Live Births   4             who was admitted for active phase labor. She received the following interventions: none She was known to be GBS negative and did not receive antibiotic prophylaxis. The patient progressed well,did receive an epidural, became complete and started to push. After pushing for twice  the fetal head was at the perineum, nose and mouth suctioned with bulb suction and the rest of the infant delivered atraumatically, placed on mother abdomen. Cord was clamped and cut and infant handed off to the waiting nurse for evaluation. The delivery of the placenta was spontaneous. The perineum and vagina were explored and a first degree laceration was repaired in standard fashion. Anesthesia:  epidural anesthesia    Estimated blood loss:  300ml    Specimen:  Placenta sent to pathology     Cord blood sent Yes    Complications:  none    Condition:  infant stable to general nursery    Freida Otero MD M.D.  FACOG

## 2021-09-02 NOTE — CARE COORDINATION
SW Discharge Planning     SW noted baby's cord stat was negative for all tested substances     Electronically signed by CUATE Chavez on 9/2/2021 at 10:41 AM

## 2021-09-30 PROBLEM — O09.529 AMA (ADVANCED MATERNAL AGE) MULTIGRAVIDA 35+: Status: RESOLVED | Noted: 2017-03-22 | Resolved: 2021-09-30

## 2021-09-30 PROBLEM — O09.299 H/O PRE-ECLAMPSIA IN PRIOR PREGNANCY, CURRENTLY PREGNANT: Status: RESOLVED | Noted: 2017-03-22 | Resolved: 2021-09-30

## 2021-09-30 PROBLEM — O26.852 SPOTTING AFFECTING PREGNANCY IN SECOND TRIMESTER: Status: RESOLVED | Noted: 2021-04-08 | Resolved: 2021-09-30

## 2021-09-30 PROBLEM — O36.8190 DECREASED FETAL MOVEMENT AFFECTING MANAGEMENT OF MOTHER, ANTEPARTUM: Status: RESOLVED | Noted: 2021-08-02 | Resolved: 2021-09-30

## 2021-09-30 PROBLEM — O36.8130 DECREASED FETUS MOVEMENTS AFFECTING MANAGEMENT OF MOTHER IN THIRD TRIMESTER: Status: RESOLVED | Noted: 2021-08-02 | Resolved: 2021-09-30

## 2021-10-14 ENCOUNTER — HOSPITAL ENCOUNTER (EMERGENCY)
Age: 41
Discharge: HOME | End: 2021-10-15
Payer: COMMERCIAL

## 2021-10-14 VITALS
HEART RATE: 88 BPM | BODY MASS INDEX: 44.34 KG/M2 | TEMPERATURE: 98.5 F | WEIGHT: 293 LBS | SYSTOLIC BLOOD PRESSURE: 147 MMHG | DIASTOLIC BLOOD PRESSURE: 75 MMHG | RESPIRATION RATE: 20 BRPM | OXYGEN SATURATION: 98 %

## 2021-10-14 DIAGNOSIS — N81.4 PROLAPSE OF UTERUS: Primary | ICD-10-CM

## 2021-10-14 DIAGNOSIS — Z3A.01 LESS THAN 8 WEEKS GESTATION OF PREGNANCY: ICD-10-CM

## 2021-10-14 PROCEDURE — 99283 EMERGENCY DEPT VISIT LOW MDM: CPT

## 2021-10-14 NOTE — ED NOTES
Vaginal exam performed by ttg with female rn's present, no bleeding noted, bedside ultrasound being done     Denzel Hodge RN  10/14/21 1901

## 2021-10-14 NOTE — ED PROVIDER NOTES
Independent:    HPI:  10/14/21, Time: 7:36 PM EDT         Flavia Padilla is a 37 y.o. female presenting to the ED for evaluation of feeling like something was \"hanging out of her vagina\", beginning prior to coming to ER. The patient reports she is around 6 weeks pregnant and has had 2 positive home pregnancy tests. She has had 4 previous pregnancies, last pregnancy in August and her infant was just discharged from [facility] with RSV. She states that she was on the toilet and noticed something \"hanging out of her vagina\" and she was taking pictures of this with her cell phone and she was googling this and thought she might be having a miscarriage and came straight to ER. She complains of no severe abdominal pain or cramping or vaginal bleeding. She has had no previous miscarriages in the past. She reports that she was trying to \"pull it out\" on her own with no success. She did not contact her OB prior to coming to ER. She denies any vaginal discharge or pain with urination. No lower back pain or pressure. The complaint has been persistent, moderate in severity. Review of Systems:   A complete review of systems was performed and pertinent positives and negatives are stated within HPI, all other systems reviewed and are negative.      --------------------------------------------- PAST HISTORY ---------------------------------------------  Past Medical History:  has a past medical history of Anemia, postpartum, Diabetes mellitus ([facility] [address] Obesity complicating pregnancy, childbirth, or puerperium, antepartum, PIH (pregnancy induced hypertension), Postpartum depression, and Traumatic injury during pregnancy. Past Surgical History:  has a past surgical history that includes Dilation & curettage (2010) and Sparks tooth extraction (2014). Social History:  reports that she has never smoked. She has never used smokeless tobacco. She reports that she does not drink alcohol and does not use drugs.     Family History: family history is not on file. The patients home medications have been reviewed. Allergies: Patient has no known allergies. -------------------------------------------------- RESULTS -------------------------------------------------  All laboratory and radiology results have been personally reviewed by myself   LABS:  No results found for this visit on 10/14/21. RADIOLOGY:  Interpreted by Radiologist.  No orders to display       ------------------------- NURSING NOTES AND VITALS REVIEWED ---------------------------   The nursing notes within the ED encounter and vital signs as below have been reviewed. BP (!) 147/75   Pulse 88   Temp 98.5 °F (36.9 °C)   Resp 20   Wt (!) 309 lb (140.2 kg)   SpO2 98%   BMI 44.34 kg/m²   Oxygen Saturation Interpretation: Normal      ---------------------------------------------------PHYSICAL EXAM--------------------------------------      Constitutional/General: Alert and oriented x3, well appearing, non toxic in NAD  Head: Normocephalic and atraumatic  Eyes: PERRL, EOMI  Mouth: Oropharynx clear  Neck: Supple, full ROM  Pulmonary: Lungs clear to auscultation bilaterally. Not in respiratory distress  Cardiovascular:  Regular rate and rhythm. 2+ distal pulses  Abdomen: Soft, obese, non tender, BS active, no rebound or guarding. Vaginal exam: Patient placed on exam table, vaginal exam noted prolapsed cervix on exam, placed back into vagina. No bleeding noted. Speculum exam noted closed cervical os, no tissue or bleeding noted. Bimanual with no CMT, no adenexal tenderness or obvious enlargement and slight enlargement of uterus and non tender. Extremities: Moves all extremities x 4. Warm and well perfused  Skin: warm and dry without rash  Neurologic: GCS 15  Psych: Normal Affect    Attempted to do brief bedside abdominal US as US not officially available at our location, but unable to identify fetal pole at this time.     ------------------------------ ED COURSE/MEDICAL DECISION MAKING----------------------  Medications - No data to display      ED COURSE:       Medical Decision Making:    Patient to ER, noticed something protruding from her vagina prior to coming to ER, was concerned she was having miscarriage. Patient advised that her exam does not support miscarriage, but more prolapse of her uterus. Patient underwent speculum exam, os closed. Discussed with patient would like to check UA, quant HCG, could send for US, etc.. Patient declined further evaluation at this time and would like to just see her OB Dr. Richie Collier as scheduled. She was given reassurance, but also advised to call Dr. Richie Collier office sooner to let her know she was in ER. Patient advised to rest, avoid intercourse andto try and see Dr. Richie Collier prior to next week if able. She is welcome to return to ER at any time if she changes her mind. Counseling: The emergency provider has spoken with the patient and male   And discussed todays results, in addition to providing specific details for the plan of care and counseling regarding the diagnosis and prognosis. Questions are answered at this time and they are agreeable with the plan.      --------------------------------- IMPRESSION AND DISPOSITION ---------------------------------    IMPRESSION  1. Prolapse of uterus    2. Less than 8 weeks gestation of pregnancy        DISPOSITION  Disposition: Discharge to home  Patient condition is stable      NOTE: This report was transcribed using voice recognition software.  Every effort was made to ensure accuracy; however, inadvertent computerized transcription errors may be present       Monica Snider PA-C  10/15/21 0399

## 2021-10-15 NOTE — ED NOTES
Discharged by [facility]ales [facility]ards @ 2000 approx     [facility]  10/15/21 [id number]

## 2022-11-08 ENCOUNTER — HOSPITAL ENCOUNTER (EMERGENCY)
Age: 42
Discharge: LEFT BEFORE DISCHARGE | End: 2022-11-08
Attending: EMERGENCY MEDICINE

## 2022-11-08 VITALS
OXYGEN SATURATION: 99 % | DIASTOLIC BLOOD PRESSURE: 90 MMHG | RESPIRATION RATE: 16 BRPM | TEMPERATURE: 98.1 F | BODY MASS INDEX: 39.94 KG/M2 | WEIGHT: 286.4 LBS | SYSTOLIC BLOOD PRESSURE: 150 MMHG | HEART RATE: 72 BPM

## 2022-11-08 RX ORDER — METOCLOPRAMIDE HYDROCHLORIDE 5 MG/ML
10 INJECTION INTRAMUSCULAR; INTRAVENOUS ONCE
Status: DISCONTINUED | OUTPATIENT
Start: 2022-11-08 | End: 2022-11-08 | Stop reason: HOSPADM

## 2022-11-08 RX ORDER — DIPHENHYDRAMINE HYDROCHLORIDE 50 MG/ML
50 INJECTION INTRAMUSCULAR; INTRAVENOUS ONCE
Status: DISCONTINUED | OUTPATIENT
Start: 2022-11-08 | End: 2022-11-08 | Stop reason: HOSPADM

## 2022-11-08 RX ORDER — 0.9 % SODIUM CHLORIDE 0.9 %
1000 INTRAVENOUS SOLUTION INTRAVENOUS ONCE
Status: DISCONTINUED | OUTPATIENT
Start: 2022-11-08 | End: 2022-11-08 | Stop reason: HOSPADM

## 2022-11-08 ASSESSMENT — PAIN DESCRIPTION - LOCATION: LOCATION: HEAD

## 2022-11-08 ASSESSMENT — PAIN - FUNCTIONAL ASSESSMENT: PAIN_FUNCTIONAL_ASSESSMENT: 0-10

## 2022-11-08 NOTE — ED NOTES
Entered room to give patient specimen container for urine pregnancy.   Patient not in room     Colletta Manus, RN  11/08/22 5537

## 2023-01-06 NOTE — PROGRESS NOTES
Patient presents to l&d at 34.0 weeks gestation c/o swelling and sharp pain behind eyes. States she gets HA frequently. Denies vb, lof, and ctx's. +FM. EFM applied. Patient had 2 previous  deliveries for preeclampsia. States she was diagnosed with gdm diet controlled. Also states she does not take any of her medications prescribed to her. States taking her blood sugar yesterday mid day and says it was around 192. Odomzo Counseling- I discussed with the patient the risks of Odomzo including but not limited to nausea, vomiting, diarrhea, constipation, weight loss, changes in the sense of taste, decreased appetite, muscle spasms, and hair loss.  The patient verbalized understanding of the proper use and possible adverse effects of Odomzo.  All of the patient's questions and concerns were addressed.

## 2023-05-19 ENCOUNTER — APPOINTMENT (OUTPATIENT)
Dept: ULTRASOUND IMAGING | Age: 43
End: 2023-05-19
Payer: COMMERCIAL

## 2023-05-19 ENCOUNTER — HOSPITAL ENCOUNTER (EMERGENCY)
Age: 43
Discharge: HOME | End: 2023-05-19
Attending: STUDENT IN AN ORGANIZED HEALTH CARE EDUCATION/TRAINING PROGRAM
Payer: COMMERCIAL

## 2023-05-19 ENCOUNTER — APPOINTMENT (OUTPATIENT)
Dept: CT IMAGING | Age: 43
End: 2023-05-19
Payer: COMMERCIAL

## 2023-05-19 VITALS
TEMPERATURE: 98.1 F | HEIGHT: 71 IN | BODY MASS INDEX: 39.2 KG/M2 | HEART RATE: 84 BPM | DIASTOLIC BLOOD PRESSURE: 75 MMHG | RESPIRATION RATE: 18 BRPM | OXYGEN SATURATION: 98 % | SYSTOLIC BLOOD PRESSURE: 135 MMHG | WEIGHT: 280 LBS

## 2023-05-19 DIAGNOSIS — M79.605 PAIN OF LEFT LOWER EXTREMITY: Primary | ICD-10-CM

## 2023-05-19 DIAGNOSIS — R00.2 PALPITATIONS: ICD-10-CM

## 2023-05-19 LAB
ALBUMIN SERPL-MCNC: 3.5 G/DL (ref 3.5–5.2)
ALP SERPL-CCNC: 72 U/L (ref 35–104)
ALT SERPL-CCNC: 26 U/L (ref 0–32)
ANION GAP SERPL CALCULATED.3IONS-SCNC: 9 MMOL/L (ref 7–16)
AST SERPL-CCNC: 19 U/L (ref 0–31)
BACTERIA URNS QL MICRO: ABNORMAL /HPF
BASOPHILS # BLD: 0.03 E9/L (ref 0–0.2)
BASOPHILS NFR BLD: 0.5 % (ref 0–2)
BILIRUB SERPL-MCNC: 0.4 MG/DL (ref 0–1.2)
BILIRUB UR QL STRIP: NEGATIVE
BUN SERPL-MCNC: 11 MG/DL (ref 6–20)
CALCIUM SERPL-MCNC: 8.3 MG/DL (ref 8.6–10.2)
CHLORIDE SERPL-SCNC: 104 MMOL/L (ref 98–107)
CLARITY UR: CLEAR
CO2 SERPL-SCNC: 26 MMOL/L (ref 22–29)
COLOR UR: YELLOW
CREAT SERPL-MCNC: 0.8 MG/DL (ref 0.5–1)
EKG ATRIAL RATE: 67 BPM
EKG P AXIS: 23 DEGREES
EKG P-R INTERVAL: 176 MS
EKG Q-T INTERVAL: 376 MS
EKG QRS DURATION: 76 MS
EKG QTC CALCULATION (BAZETT): 397 MS
EKG R AXIS: 27 DEGREES
EKG T AXIS: 11 DEGREES
EKG VENTRICULAR RATE: 67 BPM
EOSINOPHIL # BLD: 0.2 E9/L (ref 0.05–0.5)
EOSINOPHIL NFR BLD: 3.4 % (ref 0–6)
EPI CELLS #/AREA URNS HPF: ABNORMAL /HPF
ERYTHROCYTE [DISTWIDTH] IN BLOOD BY AUTOMATED COUNT: 12.8 FL (ref 11.5–15)
GLUCOSE SERPL-MCNC: 96 MG/DL (ref 74–99)
GLUCOSE UR STRIP-MCNC: NEGATIVE MG/DL
HCG UR QL: NEGATIVE
HCT VFR BLD AUTO: 37.2 % (ref 34–48)
HGB BLD-MCNC: 12.8 G/DL (ref 11.5–15.5)
HGB UR QL STRIP: ABNORMAL
IMM GRANULOCYTES # BLD: 0.02 E9/L
IMM GRANULOCYTES NFR BLD: 0.3 % (ref 0–5)
KETONES UR STRIP-MCNC: NEGATIVE MG/DL
LEUKOCYTE ESTERASE UR QL STRIP: NEGATIVE
LYMPHOCYTES # BLD: 2.06 E9/L (ref 1.5–4)
LYMPHOCYTES NFR BLD: 35.4 % (ref 20–42)
MCH RBC QN AUTO: 33.7 PG (ref 26–35)
MCHC RBC AUTO-ENTMCNC: 34.4 % (ref 32–34.5)
MCV RBC AUTO: 97.9 FL (ref 80–99.9)
MONOCYTES # BLD: 0.38 E9/L (ref 0.1–0.95)
MONOCYTES NFR BLD: 6.5 % (ref 2–12)
NEUTROPHILS # BLD: 3.13 E9/L (ref 1.8–7.3)
NEUTS SEG NFR BLD: 53.9 % (ref 43–80)
NITRITE UR QL STRIP: NEGATIVE
PH UR STRIP: 6 [PH] (ref 5–9)
PLATELET # BLD AUTO: 326 E9/L (ref 130–450)
PMV BLD AUTO: 8.9 FL (ref 7–12)
POTASSIUM SERPL-SCNC: 3.9 MMOL/L (ref 3.5–5)
PROT SERPL-MCNC: 6.8 G/DL (ref 6.4–8.3)
PROT UR STRIP-MCNC: NEGATIVE MG/DL
RBC # BLD AUTO: 3.8 E12/L (ref 3.5–5.5)
RBC #/AREA URNS HPF: ABNORMAL /HPF (ref 0–2)
SODIUM SERPL-SCNC: 139 MMOL/L (ref 132–146)
SP GR UR STRIP: 1.02 (ref 1–1.03)
TROPONIN, HIGH SENSITIVITY: 7 NG/L (ref 0–9)
TROPONIN, HIGH SENSITIVITY: 8 NG/L (ref 0–9)
UROBILINOGEN UR STRIP-ACNC: 1 E.U./DL
WBC # BLD: 5.8 E9/L (ref 4.5–11.5)
WBC #/AREA URNS HPF: ABNORMAL /HPF (ref 0–5)

## 2023-05-19 PROCEDURE — 74174 CTA ABD&PLVS W/CONTRAST: CPT

## 2023-05-19 PROCEDURE — 71275 CT ANGIOGRAPHY CHEST: CPT

## 2023-05-19 PROCEDURE — 99285 EMERGENCY DEPT VISIT HI MDM: CPT

## 2023-05-19 PROCEDURE — 80053 COMPREHEN METABOLIC PANEL: CPT

## 2023-05-19 PROCEDURE — 96374 THER/PROPH/DIAG INJ IV PUSH: CPT

## 2023-05-19 PROCEDURE — 6360000004 HC RX CONTRAST MEDICATION: Performed by: RADIOLOGY

## 2023-05-19 PROCEDURE — 93005 ELECTROCARDIOGRAM TRACING: CPT | Performed by: STUDENT IN AN ORGANIZED HEALTH CARE EDUCATION/TRAINING PROGRAM

## 2023-05-19 PROCEDURE — 85025 COMPLETE CBC W/AUTO DIFF WBC: CPT

## 2023-05-19 PROCEDURE — 70450 CT HEAD/BRAIN W/O DYE: CPT

## 2023-05-19 PROCEDURE — 93971 EXTREMITY STUDY: CPT

## 2023-05-19 PROCEDURE — 93010 ELECTROCARDIOGRAM REPORT: CPT | Performed by: INTERNAL MEDICINE

## 2023-05-19 PROCEDURE — 81025 URINE PREGNANCY TEST: CPT

## 2023-05-19 PROCEDURE — 81001 URINALYSIS AUTO W/SCOPE: CPT

## 2023-05-19 PROCEDURE — 36415 COLL VENOUS BLD VENIPUNCTURE: CPT

## 2023-05-19 PROCEDURE — 6360000002 HC RX W HCPCS: Performed by: STUDENT IN AN ORGANIZED HEALTH CARE EDUCATION/TRAINING PROGRAM

## 2023-05-19 PROCEDURE — 84484 ASSAY OF TROPONIN QUANT: CPT

## 2023-05-19 RX ORDER — IBUPROFEN 800 MG/1
800 TABLET ORAL EVERY 8 HOURS PRN
Qty: 30 TABLET | Refills: 0 | Status: SHIPPED | OUTPATIENT
Start: 2023-05-19

## 2023-05-19 RX ORDER — KETOROLAC TROMETHAMINE 30 MG/ML
15 INJECTION, SOLUTION INTRAMUSCULAR; INTRAVENOUS ONCE
Status: COMPLETED | OUTPATIENT
Start: 2023-05-19 | End: 2023-05-19

## 2023-05-19 RX ADMIN — IOPAMIDOL 75 ML: 755 INJECTION, SOLUTION INTRAVENOUS at 14:56

## 2023-05-19 RX ADMIN — KETOROLAC TROMETHAMINE 15 MG: 30 INJECTION, SOLUTION INTRAMUSCULAR; INTRAVENOUS at 17:11

## 2023-05-19 ASSESSMENT — ENCOUNTER SYMPTOMS
ABDOMINAL PAIN: 0
SHORTNESS OF BREATH: 0
NAUSEA: 0
COUGH: 0
VOMITING: 0
DIARRHEA: 0
BACK PAIN: 0
COLOR CHANGE: 0

## 2023-05-19 ASSESSMENT — PAIN DESCRIPTION - DESCRIPTORS
DESCRIPTORS: SORE
DESCRIPTORS: SORE
DESCRIPTORS: ACHING;NUMBNESS

## 2023-05-19 ASSESSMENT — PAIN - FUNCTIONAL ASSESSMENT
PAIN_FUNCTIONAL_ASSESSMENT: PREVENTS OR INTERFERES SOME ACTIVE ACTIVITIES AND ADLS
PAIN_FUNCTIONAL_ASSESSMENT: 0-10
PAIN_FUNCTIONAL_ASSESSMENT: NONE - DENIES PAIN

## 2023-05-19 ASSESSMENT — PAIN DESCRIPTION - LOCATION
LOCATION: CHEST
LOCATION: LEG
LOCATION: CHEST

## 2023-05-19 ASSESSMENT — PAIN DESCRIPTION - PAIN TYPE
TYPE: ACUTE PAIN
TYPE: ACUTE PAIN

## 2023-05-19 ASSESSMENT — PAIN DESCRIPTION - ORIENTATION
ORIENTATION: MID
ORIENTATION: LEFT
ORIENTATION: MID

## 2023-05-19 ASSESSMENT — PAIN SCALES - GENERAL
PAINLEVEL_OUTOF10: 6
PAINLEVEL_OUTOF10: 8

## 2023-05-19 ASSESSMENT — PAIN DESCRIPTION - FREQUENCY: FREQUENCY: INTERMITTENT

## 2023-05-19 NOTE — ED NOTES
Pt given discharge summary with education on pain of left lower extremity and palpitations.   Pt aware of motrin awaiting at her assigned pharmacy and pt given [facility] pre-service line to help select a primary care physician     Jorgito Church RN  05/19/23 [id number]

## 2023-05-19 NOTE — ED PROVIDER NOTES
HPI   45-year-old female patient presents emergency department for evaluation of left lower extremity numbness, sensation change. She reports this been going on for the last 3 days. She states she has pain behind the left knee. She also notes that she started having palpitations, although when I asked her if she has any chest pain she describes it as a fluttering sensation. She denies any history of dissection. No syncope. No shortness of breath no nausea vomiting or diarrhea. When asked in further detail about the left leg numbness that she states that it is more like a pain behind the left knee and \"just feels different \"  Review of Systems   Constitutional:  Negative for chills and fever. HENT:  Negative for congestion. Respiratory:  Negative for cough and shortness of breath. Cardiovascular:  Positive for chest pain and palpitations. Gastrointestinal:  Negative for abdominal pain, diarrhea, nausea and vomiting. Genitourinary:  Negative for difficulty urinating, dysuria and hematuria. Musculoskeletal:  Negative for back pain. Left leg sensory change, pain behind left knee. Skin:  Negative for color change. All other systems reviewed and are negative. Physical Exam  Vitals and nursing note reviewed. Constitutional:       Appearance: Normal appearance. HENT:      Head: Normocephalic and atraumatic. Nose: Nose normal. No congestion. Mouth/Throat:      Mouth: Mucous membranes are moist.      Pharynx: Oropharynx is clear. Eyes:      Conjunctiva/sclera: Conjunctivae normal.      Pupils: Pupils are equal, round, and reactive to light. Cardiovascular:      Rate and Rhythm: Normal rate and regular rhythm. Pulses: Normal pulses. Dorsalis pedis pulses are 2+ on the right side and 2+ on the left side. Posterior tibial pulses are 2+ on the right side and 2+ on the left side. Heart sounds: Normal heart sounds.    Pulmonary:      Effort:

## 2023-05-19 NOTE — PROGRESS NOTES
Radiology Procedure Waiver   Name: Lizbeth Cameron  : 1980  MRN: [id number]    Date:  23    Time: 12:49 PM EDT    Benefits of immediately proceeding with radiology exam(s) without pre-testing outweigh the risks or are not indicated as specified below and therefore the following is/are being waived:    [x] Benefits of immediate radiology exam(s) outweigh any risk. OR    Pre-exam testing is not indicated for the following reason(s):  [] Pregnancy test   [] Patients LMP on-time and regular.   [] Patient had Tubal Ligation or has other Contraception Device. [] Patient  is Menopausal or Premenarcheal.    [] Patient had Full or Partial Hysterectomy. [] Protocol for CT contrast allegry   [] Patient has tolerated well previously   [] Patient does not have a true allergy    [] MRI Questionnaire     [] BUN/Creatinine   [] Patient age w/no hx of renal dysfunction. [] Patient on Dialysis. [] Recent Normal Labs.   Electronically signed by Tyree Vizcarra DO on 23 at 12:49 PM EDT